# Patient Record
Sex: FEMALE | Race: WHITE | Employment: OTHER | URBAN - METROPOLITAN AREA
[De-identification: names, ages, dates, MRNs, and addresses within clinical notes are randomized per-mention and may not be internally consistent; named-entity substitution may affect disease eponyms.]

---

## 2017-03-04 ENCOUNTER — APPOINTMENT (OUTPATIENT)
Dept: GENERAL RADIOLOGY | Age: 67
End: 2017-03-04
Attending: NURSE PRACTITIONER
Payer: MEDICARE

## 2017-03-04 ENCOUNTER — HOSPITAL ENCOUNTER (EMERGENCY)
Age: 67
Discharge: HOME | End: 2017-03-04
Attending: STUDENT IN AN ORGANIZED HEALTH CARE EDUCATION/TRAINING PROGRAM
Payer: MEDICARE

## 2017-03-04 VITALS
HEIGHT: 64 IN | RESPIRATION RATE: 14 BRPM | TEMPERATURE: 98.1 F | BODY MASS INDEX: 22.2 KG/M2 | HEART RATE: 74 BPM | OXYGEN SATURATION: 99 % | WEIGHT: 130 LBS | DIASTOLIC BLOOD PRESSURE: 73 MMHG | SYSTOLIC BLOOD PRESSURE: 120 MMHG

## 2017-03-04 DIAGNOSIS — Z88.9 HX OF SEASONAL ALLERGIES: Primary | ICD-10-CM

## 2017-03-04 DIAGNOSIS — R07.89 ATYPICAL CHEST PAIN: ICD-10-CM

## 2017-03-04 LAB
ALBUMIN SERPL BCP-MCNC: 3.9 G/DL (ref 3.5–5)
ALBUMIN/GLOB SERPL: 1 {RATIO} (ref 1.1–2.2)
ALP SERPL-CCNC: 56 U/L (ref 45–117)
ALT SERPL-CCNC: 31 U/L (ref 12–78)
ANION GAP BLD CALC-SCNC: 6 MMOL/L (ref 5–15)
AST SERPL W P-5'-P-CCNC: 20 U/L (ref 15–37)
BASOPHILS # BLD AUTO: 0 K/UL (ref 0–0.1)
BASOPHILS # BLD: 1 % (ref 0–1)
BILIRUB SERPL-MCNC: 0.2 MG/DL (ref 0.2–1)
BUN SERPL-MCNC: 11 MG/DL (ref 6–20)
BUN/CREAT SERPL: 10 (ref 12–20)
CALCIUM SERPL-MCNC: 9.2 MG/DL (ref 8.5–10.1)
CHLORIDE SERPL-SCNC: 101 MMOL/L (ref 97–108)
CO2 SERPL-SCNC: 30 MMOL/L (ref 21–32)
CREAT SERPL-MCNC: 1.06 MG/DL (ref 0.55–1.02)
EOSINOPHIL # BLD: 0.2 K/UL (ref 0–0.4)
EOSINOPHIL NFR BLD: 3 % (ref 0–7)
ERYTHROCYTE [DISTWIDTH] IN BLOOD BY AUTOMATED COUNT: 13.6 % (ref 11.5–14.5)
GLOBULIN SER CALC-MCNC: 3.8 G/DL (ref 2–4)
GLUCOSE SERPL-MCNC: 113 MG/DL (ref 65–100)
HCT VFR BLD AUTO: 38.7 % (ref 35–47)
HGB BLD-MCNC: 12.6 G/DL (ref 11.5–16)
LYMPHOCYTES # BLD AUTO: 39 % (ref 12–49)
LYMPHOCYTES # BLD: 2.4 K/UL (ref 0.8–3.5)
MCH RBC QN AUTO: 27.8 PG (ref 26–34)
MCHC RBC AUTO-ENTMCNC: 32.6 G/DL (ref 30–36.5)
MCV RBC AUTO: 85.2 FL (ref 80–99)
MONOCYTES # BLD: 0.4 K/UL (ref 0–1)
MONOCYTES NFR BLD AUTO: 6 % (ref 5–13)
NEUTS SEG # BLD: 3.3 K/UL (ref 1.8–8)
NEUTS SEG NFR BLD AUTO: 51 % (ref 32–75)
PLATELET # BLD AUTO: 303 K/UL (ref 150–400)
POTASSIUM SERPL-SCNC: 4 MMOL/L (ref 3.5–5.1)
PROT SERPL-MCNC: 7.7 G/DL (ref 6.4–8.2)
RBC # BLD AUTO: 4.54 M/UL (ref 3.8–5.2)
SODIUM SERPL-SCNC: 137 MMOL/L (ref 136–145)
TROPONIN I SERPL-MCNC: <0.04 NG/ML
WBC # BLD AUTO: 6.3 K/UL (ref 3.6–11)

## 2017-03-04 PROCEDURE — 84484 ASSAY OF TROPONIN QUANT: CPT | Performed by: EMERGENCY MEDICINE

## 2017-03-04 PROCEDURE — 99284 EMERGENCY DEPT VISIT MOD MDM: CPT

## 2017-03-04 PROCEDURE — 93005 ELECTROCARDIOGRAM TRACING: CPT

## 2017-03-04 PROCEDURE — 85025 COMPLETE CBC W/AUTO DIFF WBC: CPT | Performed by: EMERGENCY MEDICINE

## 2017-03-04 PROCEDURE — 71020 XR CHEST PA LAT: CPT

## 2017-03-04 PROCEDURE — 36415 COLL VENOUS BLD VENIPUNCTURE: CPT | Performed by: EMERGENCY MEDICINE

## 2017-03-04 PROCEDURE — 80053 COMPREHEN METABOLIC PANEL: CPT | Performed by: EMERGENCY MEDICINE

## 2017-03-04 RX ORDER — KETOROLAC TROMETHAMINE 10 MG/1
10 TABLET, FILM COATED ORAL
Qty: 30 TAB | Refills: 0 | Status: SHIPPED | OUTPATIENT
Start: 2017-03-04 | End: 2019-05-20

## 2017-03-04 RX ORDER — FLUTICASONE PROPIONATE 50 MCG
2 SPRAY, SUSPENSION (ML) NASAL DAILY
Qty: 1 BOTTLE | Refills: 0 | Status: SHIPPED | OUTPATIENT
Start: 2017-03-04 | End: 2019-05-20

## 2017-03-04 NOTE — ED TRIAGE NOTES
Triage note: Pt states she has felt like an elephant was sitting on her chest x 3 days. Pt states her right shoulder hurts.

## 2017-03-05 NOTE — DISCHARGE INSTRUCTIONS
We hope that we have addressed all of your medical concerns. The examination and treatment you received in the Emergency Department were for an emergent problem and were not intended as complete care. It is important that you follow up with your healthcare provider(s) for ongoing care. If your symptoms worsen or do not improve as expected, and you are unable to reach your usual health care provider(s), you should return to the Emergency Department. Today's healthcare is undergoing tremendous change, and patient satisfaction surveys are one of the many tools to assess the quality of medical care. You may receive a survey from the [employer] regarding your experience in the Emergency Department. I hope that your experience has been completely positive, particularly the medical care that I provided. As such, please participate in the survey; anything less than excellent does not meet my expectations or intentions. [facility] and [address] [place] [address] participate in nationally recognized quality of care measures. If your blood pressure is greater than 120/80, as reported below, we urge that you seek medical care to address the potential of high blood pressure, commonly known as hypertension. Hypertension can be hereditary or can be caused by certain medical conditions, pain, stress, or \"white coat syndrome. \"       Please make an appointment with your health care provider(s) for follow up of your Emergency Department visit. VITALS:   Patient Vitals for the past 8 hrs:   Temp Pulse Resp BP SpO2   03/04/17 1500 97.8 °F (36.6 °C) 73 18 139/77 98 %          Thank you for allowing us to provide you with medical care today. We realize that you have many choices for your emergency care needs. Please choose us in the future for any continued health care needs. David Joaquin [place], [address] [place] [address].   Office: [phone]            Recent Results (from the past 24 hour(s))   EKG, 12 LEAD, INITIAL    Collection Time: 03/04/17  3:05 PM   Result Value Ref Range    Ventricular Rate 71 BPM    Atrial Rate 71 BPM    P-R Interval 180 ms    QRS Duration 82 ms    Q-T Interval 402 ms    QTC Calculation (Bezet) 436 ms    Calculated P Axis 68 degrees    Calculated R Axis 54 degrees    Calculated T Axis 54 degrees    Diagnosis       Normal sinus rhythm  When compared with ECG of 20-AUG-2003 02:14,  No significant change was found     CBC WITH AUTOMATED DIFF    Collection Time: 03/04/17  3:10 PM   Result Value Ref Range    WBC 6.3 3.6 - 11.0 K/uL    RBC 4.54 3.80 - 5.20 M/uL    HGB 12.6 11.5 - 16.0 g/dL    HCT 38.7 35.0 - 47.0 %    MCV 85.2 80.0 - 99.0 FL    MCH 27.8 26.0 - 34.0 PG    MCHC 32.6 30.0 - 36.5 g/dL    RDW 13.6 11.5 - 14.5 %    PLATELET 017 840 - 286 K/uL    NEUTROPHILS 51 32 - 75 %    LYMPHOCYTES 39 12 - 49 %    MONOCYTES 6 5 - 13 %    EOSINOPHILS 3 0 - 7 %    BASOPHILS 1 0 - 1 %    ABS. NEUTROPHILS 3.3 1.8 - 8.0 K/UL    ABS. LYMPHOCYTES 2.4 0.8 - 3.5 K/UL    ABS. MONOCYTES 0.4 0.0 - 1.0 K/UL    ABS. EOSINOPHILS 0.2 0.0 - 0.4 K/UL    ABS. BASOPHILS 0.0 0.0 - 0.1 K/UL   METABOLIC PANEL, COMPREHENSIVE    Collection Time: 03/04/17  3:10 PM   Result Value Ref Range    Sodium 137 136 - 145 mmol/L    Potassium 4.0 3.5 - 5.1 mmol/L    Chloride 101 97 - 108 mmol/L    CO2 30 21 - 32 mmol/L    Anion gap 6 5 - 15 mmol/L    Glucose 113 (H) 65 - 100 mg/dL    BUN 11 6 - 20 MG/DL    Creatinine 1.06 (H) 0.55 - 1.02 MG/DL    BUN/Creatinine ratio 10 (L) 12 - 20      GFR est AA >60 >60 ml/min/1.73m2    GFR est non-AA 52 (L) >60 ml/min/1.73m2    Calcium 9.2 8.5 - 10.1 MG/DL    Bilirubin, total 0.2 0.2 - 1.0 MG/DL    ALT (SGPT) 31 12 - 78 U/L    AST (SGOT) 20 15 - 37 U/L    Alk.  phosphatase 56 45 - 117 U/L    Protein, total 7.7 6.4 - 8.2 g/dL    Albumin 3.9 3.5 - 5.0 g/dL    Globulin 3.8 2.0 - 4.0 g/dL    A-G Ratio 1.0 (L) 1.1 - 2.2 TROPONIN I    Collection Time: 03/04/17  3:10 PM   Result Value Ref Range    Troponin-I, Qt. <0.04 <0.05 ng/mL       Xr Chest Pa Lat    Result Date: 3/4/2017  PA AND LATERAL CHEST RADIOGRAPHS: 3/4/2017 7:22 PM INDICATION: Chest pain, dyspnea for 3 days. COMPARISON: None. TECHNIQUE: Frontal and lateral radiographs of the chest. FINDINGS: The lungs are clear. The central airways are patent. The heart size is normal. No pneumothorax or pleural effusion. IMPRESSION: Clear lungs. Musculoskeletal Chest Pain: Care Instructions  Your Care Instructions  Chest pain is not always a sign that something is wrong with your heart or that you have another serious problem. The doctor thinks your chest pain is caused by strained muscles or ligaments, inflamed chest cartilage, or another problem in your chest, rather than by your heart. You may need more tests to find the cause of your chest pain. Follow-up care is a key part of your treatment and safety. Be sure to make and go to all appointments, and call your doctor if you are having problems. Its also a good idea to know your test results and keep a list of the medicines you take. How can you care for yourself at home? · Take pain medicines exactly as directed. ¨ If the doctor gave you a prescription medicine for pain, take it as prescribed. ¨ If you are not taking a prescription pain medicine, ask your doctor if you can take an over-the-counter medicine. · Rest and protect the sore area. · Stop, change, or take a break from any activity that may be causing your pain or soreness. · Put ice or a cold pack on the sore area for 10 to 20 minutes at a time. Try to do this every 1 to 2 hours for the next 3 days (when you are awake) or until the swelling goes down. Put a thin cloth between the ice and your skin. · After 2 or 3 days, apply a heating pad set on low or a warm cloth to the area that hurts.  Some doctors suggest that you go back and forth between hot and cold. · Do not wrap or tape your ribs for support. This may cause you to take smaller breaths, which could increase your risk of lung problems. · Mentholated creams such as Bengay or Icy Hot may soothe sore muscles. Follow the instructions on the package. · Follow your doctor's instructions for exercising. · Gentle stretching and massage may help you get better faster. Stretch slowly to the point just before pain begins, and hold the stretch for at least 15 to 30 seconds. Do this 3 or 4 times a day. Stretch just after you have applied heat. · As your pain gets better, slowly return to your normal activities. Any increased pain may be a sign that you need to rest a while longer. When should you call for help? Call 911 anytime you think you may need emergency care. For example, call if:  · You have chest pain or pressure. This may occur with:  ¨ Sweating. ¨ Shortness of breath. ¨ Nausea or vomiting. ¨ Pain that spreads from the chest to the neck, jaw, or one or both shoulders or arms. ¨ Dizziness or lightheadedness. ¨ A fast or uneven pulse. After calling 911, chew 1 adult-strength aspirin. Wait for an ambulance. Do not try to drive yourself. · You have sudden chest pain and shortness of breath, or you cough up blood. Call your doctor now or seek immediate medical care if:  · You have any trouble breathing. · Your chest pain gets worse. · Your chest pain occurs consistently with exercise and is relieved by rest.  Watch closely for changes in your health, and be sure to contact your doctor if:  · Your chest pain does not get better after 1 week. Where can you learn more? Go to http://franco-ele.info/. Enter V293 in the search box to learn more about \"Musculoskeletal Chest Pain: Care Instructions. \"  Current as of: May 27, 2016  Content Version: 11.1  © 5438-0655 [employer].  Care instructions adapted under license by [employer] (which disclaims liability or warranty for this information). If you have questions about a medical condition or this instruction, always ask your healthcare professional. [place] [address] any warranty or liability for your use of this information.

## 2017-03-05 NOTE — ED PROVIDER NOTES
HPI Comments: Antonio Sargent is a 78 y.o. female  who presents ambulatory to [facility] ED with cc of CP and R shoulder pain. Pt reports recent history of seasonal allergies with congestion, rhinorrhea (clear nasal drainage), and sore throat. She states she has experienced a \"heaviness\" to her chest over the last 3 days. She reports this as constant and relieved by taking NSAIDs. She states that she had atraumatic R shoulder pain today and this was concerning to her because of the CP. She reports she took her BP after the shoulder pain and noted it to be more elevated than normal at 140s/90s. She states that she had a stress test/ Holter monitor approximately 6 mos ago, both of which were normal studies. These were ordered because the patient was having palpitations and intermittent SOB. She doesn't abuse tobacco, have hx of CAD, or family hx of CAD. She has no hx of increased stressors, daily caffeine intake, or starting of herbal supplements. No F/C, N/V/D, MITCHELL, night sweats, dysuria, urinary frequency/hesitancy, flank pain. PCP: Liz Bunch MD    There are no other complaints, changes or physical findings at this time. The history is provided by the patient. History reviewed. No pertinent past medical history. Past Surgical History:   Procedure Laterality Date    COLONOSCOPY N/A 11/11/2016    COLONOSCOPY performed by Angy Huang MD at [facility] ENDOSCOPY    HX GYN      ovarian cyst         History reviewed. No pertinent family history. Social History     Social History    Marital status:      Spouse name: N/A    Number of children: N/A    Years of education: N/A     Occupational History    Not on file.      Social History Main Topics    Smoking status: Former Smoker     Quit date: 11/11/1970    Smokeless tobacco: Never Used    Alcohol use Yes    Drug use: Not on file    Sexual activity: Not on file     Other Topics Concern    Not on file     Social History Narrative ALLERGIES: Penicillin g    Review of Systems   Constitutional: Negative for activity change, appetite change, chills and fever. HENT: Positive for congestion, rhinorrhea and sore throat. Negative for sinus pressure and sneezing. Eyes: Negative for pain, discharge and visual disturbance. Respiratory: Negative for cough and shortness of breath. Cardiovascular: Positive for chest pain. Gastrointestinal: Negative for abdominal pain, diarrhea, nausea and vomiting. Genitourinary: Negative for dysuria, flank pain, frequency and urgency. Musculoskeletal: Positive for arthralgias. Negative for back pain, gait problem, joint swelling, myalgias and neck pain. Skin: Negative for color change and rash. Neurological: Negative for dizziness, speech difficulty, weakness, light-headedness, numbness and headaches. Psychiatric/Behavioral: Negative for agitation, behavioral problems and confusion. All other systems reviewed and are negative. Vitals:    03/04/17 1955 03/04/17 2000 03/04/17 2005 03/04/17 2010   BP:  120/73     Pulse: 77 83 75 74   Resp: 15 15 16 14   Temp:  98.1 °F (36.7 °C)     SpO2: 99% 97% 98% 99%   Weight:       Height:                Physical Exam   Constitutional: She is oriented to person, place, and time. She appears well-developed and well-nourished. No distress. HENT:   Head: Normocephalic and atraumatic. Right Ear: External ear normal.   Left Ear: External ear normal.   Nose: Mucosal edema and rhinorrhea present. Mouth/Throat: Uvula is midline and mucous membranes are normal. Posterior oropharyngeal edema present. No oropharyngeal exudate. Eyes: Conjunctivae and EOM are normal. Pupils are equal, round, and reactive to light. Neck: Normal range of motion. Neck supple. Cardiovascular: Normal rate, regular rhythm, normal heart sounds and intact distal pulses. Pulmonary/Chest: Effort normal and breath sounds normal.   Abdominal: Soft.  Bowel sounds are normal. There is no tenderness. There is no rebound and no guarding. Musculoskeletal: Normal range of motion. Right shoulder: Normal.   Neurological: She is alert and oriented to person, place, and time. Skin: Skin is warm and dry. Psychiatric: She has a normal mood and affect. Her behavior is normal. Judgment and thought content normal.   Nursing note and vitals reviewed. MDM  Number of Diagnoses or Management Options  Atypical chest pain:   Hx of seasonal allergies:   Diagnosis management comments: DDx: ACS, MSK CP, PNA     77 yo F presents with atypical CP. No emergent diagnostic findings. The patient has no chest pain on re-examination. She has had continuous chest pain for greater than 8 hours with one negative set of cardiac enzymes in the ER during this visit. Diagnosis, follow up, return instructions, test results, x-rays and medications have been discussed and reviewed with the patient and/or family. The patient and/or family have been given the opportunity to ask questions. The patient and/or family express understanding of the care plan, follow-up appointments and return instructions. The patient and/or family agree to follow up with cardiology as directed and to return immediately if the chest pain worsens. The patient and family express understanding that although cardiac testing at this time is negative, a cardiac problem could still be present and that a follow-up appointment with a cardiologist for further evaluation and risk factor modification is necessary to complete the evaluation of this complaint.          Amount and/or Complexity of Data Reviewed  Clinical lab tests: ordered and reviewed  Tests in the radiology section of CPT®: ordered and reviewed  Review and summarize past medical records: yes  Discuss the patient with other providers: yes      ED Course       Procedures    LABORATORY TESTS:    CBC WITH AUTOMATED DIFF (Final result) Component (Lab Inquiry)       Collection Time Result Time WBC RBC HGB HCT MCV     03/04/17 15:10:00 03/04/17 15:18:55 6.3 4.54 12.6 38.7 85.2          Collection Time Result Time MCH MCHC RDW PLT GRANS     03/04/17 15:10:00 03/04/17 15:18:55 27.8 32.6 13.6 303 51          Collection Time Result Time LYMPH MONOS EOS BASOS ABG     03/04/17 15:10:00 03/04/17 15:18:55 39 6 3 1 3.3          Collection Time Result Time ABL ABM CARLITO ABB     03/04/17 15:10:00 03/04/17 15:18:55 2.4 0.4 0.2 0.0                      METABOLIC PANEL, COMPREHENSIVE (Final result)    Abnormal Component (Lab Inquiry)       Collection Time Result Time NA K CL CO2 AGAP     03/04/17 15:10:00 03/04/17 15:45:31 137 4.0 101 30 6          Collection Time Result Time GLU BUN CREA BUCR GFRAA     03/04/17 15:10:00 03/04/17 15:45:31 113 (H) 11 1.06 (H) 10 (L) >60          Collection Time Result Time GFRNA CA TBIL GPT SGOT     03/04/17 15:10:00 03/04/17 15:45:31 Estimated GFR is calculated using the IDMS-traceable Modification of Diet in Renal Disease (MDRD) Study equation, reported for both  Americans (GFRAA) and non- Americans (GFRNA), and normalized to 1.73m2 body surface area. The physician must decide which value applies to the patient. The MDRD study equation should only be used in individuals age 25 or older. It has not been validated for the following: pregnant women, patients with serious comorbid conditions, or on certain medications, or persons with extremes of body size, muscle mass, or nutritional status.    ' data-bubble=\"IP_HOVER_BUBBLE_SERVICE\">52 (L)  Estimated GFR is calcu. .. 9.2 0.2 31 20          Collection Time Result Time AP TP ALB GLOB AGRAT     03/04/17 15:10:00 03/04/17 15:45:31 56 7.7 3.9 3.8 1.0 (L)                      TROPONIN I (Final result) Component (Lab Inquiry)       Collection Time Result Time TROIQ     03/04/17 15:10:00 03/04/17 15:45:31 The presence of detectable troponin above the reference range indicates myocardial injury which may be due to ischemia, myocarditis, trauma, etc.   Clinical correlation is necessary to establish the significance of this finding. Sequential testing is recommended to determine if the typical rise and fall of cTnI is demonstrated. Note: Cardiac troponin I has a relatively long half life and may be present well after the CK MB has returned to baseline. The reference range is based on the 99th percentile of the referent population.    ' data-bubble=\"IP_HOVER_BUBBLE_SERVICE\"><0.04  The presence of detect. ..              IMAGING RESULTS:  XR CHEST PA LAT   Final Result      PA AND LATERAL CHEST RADIOGRAPHS: 3/4/2017 7:22 PM     INDICATION: Chest pain, dyspnea for 3 days.     COMPARISON: None.     TECHNIQUE: Frontal and lateral radiographs of the chest.     FINDINGS:  The lungs are clear. The central airways are patent. The heart size is normal.  No pneumothorax or pleural effusion.     IMPRESSION  IMPRESSION:   Clear lungs. MEDICATIONS GIVEN:  Medications - No data to display    IMPRESSION:  1. Hx of seasonal allergies    2. Atypical chest pain        PLAN:  1. Discharge Medication List as of 3/4/2017  7:54 PM      START taking these medications    Details   fluticasone (FLONASE) 50 mcg/actuation nasal spray 2 Sprays by Both Nostrils route daily. , Print, Disp-1 Bottle, R-0      ketorolac (TORADOL) 10 mg tablet Take 1 Tab by mouth every six (6) hours as needed for Pain., Print, Disp-30 Tab, R-0         CONTINUE these medications which have NOT CHANGED    Details   cholecalciferol (VITAMIN D3) 1,000 unit tablet Take 1,000 Units by mouth daily. , Historical Med           2.    Follow-up Information     Follow up With Details Comments Contact Info    Stefan Saavedra MD Schedule an appointment as soon as possible for a visit Please go see on Monday or Tuesday for follow up from your ED visit  [address] [facility] [address]  [address] [phone]  [phone]      [place] [address] DEP Go to As needed, If symptoms worsen [id number] [facility]  [phone]    Jr Tolentino MD Schedule an appointment as soon as possible for a visit for any ongoing chest discomfort  [address] [facility]  [address]  [phone]          3. Return to ED if worse     Discharge Note:    The patient is ready for discharge. The patient's signs, symptoms, diagnosis, and discharge instruction have been discussed and the patient has conveyed their understanding. The patient is to follow up as recommended or return to the ER should their symptoms worsen. Plan has been discussed and the patient is in agreement.     Stephanie Barragan NP

## 2017-03-06 LAB
ATRIAL RATE: 71 BPM
CALCULATED P AXIS, ECG09: 68 DEGREES
CALCULATED R AXIS, ECG10: 54 DEGREES
CALCULATED T AXIS, ECG11: 54 DEGREES
DIAGNOSIS, 93000: NORMAL
P-R INTERVAL, ECG05: 180 MS
Q-T INTERVAL, ECG07: 402 MS
QRS DURATION, ECG06: 82 MS
QTC CALCULATION (BEZET), ECG08: 436 MS
VENTRICULAR RATE, ECG03: 71 BPM

## 2018-07-16 ENCOUNTER — HOSPITAL ENCOUNTER (EMERGENCY)
Age: 68
Discharge: HOME | End: 2018-07-16
Attending: STUDENT IN AN ORGANIZED HEALTH CARE EDUCATION/TRAINING PROGRAM
Payer: MEDICARE

## 2018-07-16 VITALS
HEART RATE: 78 BPM | SYSTOLIC BLOOD PRESSURE: 137 MMHG | BODY MASS INDEX: 23.45 KG/M2 | DIASTOLIC BLOOD PRESSURE: 80 MMHG | RESPIRATION RATE: 16 BRPM | OXYGEN SATURATION: 98 % | WEIGHT: 137.38 LBS | HEIGHT: 64 IN | TEMPERATURE: 98.1 F

## 2018-07-16 DIAGNOSIS — S81.811A LACERATION OF RIGHT LOWER EXTREMITY EXCLUDING THIGH, INITIAL ENCOUNTER: Primary | ICD-10-CM

## 2018-07-16 PROCEDURE — 74011000250 HC RX REV CODE- 250: Performed by: STUDENT IN AN ORGANIZED HEALTH CARE EDUCATION/TRAINING PROGRAM

## 2018-07-16 PROCEDURE — 90471 IMMUNIZATION ADMIN: CPT

## 2018-07-16 PROCEDURE — 75810000293 HC SIMP/SUPERF WND  RPR

## 2018-07-16 PROCEDURE — 77030018836 HC SOL IRR NACL ICUM -A

## 2018-07-16 PROCEDURE — 77030002986 HC SUT PROL J&J -A

## 2018-07-16 PROCEDURE — 90715 TDAP VACCINE 7 YRS/> IM: CPT | Performed by: PHYSICIAN ASSISTANT

## 2018-07-16 PROCEDURE — 99282 EMERGENCY DEPT VISIT SF MDM: CPT

## 2018-07-16 PROCEDURE — 74011250636 HC RX REV CODE- 250/636: Performed by: PHYSICIAN ASSISTANT

## 2018-07-16 RX ORDER — LIDOCAINE HYDROCHLORIDE AND EPINEPHRINE 10; 10 MG/ML; UG/ML
1.5 INJECTION, SOLUTION INFILTRATION; PERINEURAL
Status: COMPLETED | OUTPATIENT
Start: 2018-07-16 | End: 2018-07-16

## 2018-07-16 RX ORDER — BACITRACIN 500 UNIT/G
1 PACKET (EA) TOPICAL
Status: COMPLETED | OUTPATIENT
Start: 2018-07-16 | End: 2018-07-16

## 2018-07-16 RX ADMIN — BACITRACIN 1 PACKET: 500 OINTMENT TOPICAL at 16:19

## 2018-07-16 RX ADMIN — TETANUS TOXOID, REDUCED DIPHTHERIA TOXOID AND ACELLULAR PERTUSSIS VACCINE, ADSORBED 0.5 ML: 5; 2.5; 8; 8; 2.5 SUSPENSION INTRAMUSCULAR at 14:27

## 2018-07-16 RX ADMIN — LIDOCAINE HYDROCHLORIDE,EPINEPHRINE BITARTRATE 15 MG: 10; .01 INJECTION, SOLUTION INFILTRATION; PERINEURAL at 15:07

## 2018-07-16 NOTE — ED TRIAGE NOTES
Pt states that a storm door hit the back of her right heel causing a lot of bleeding which has stopped at this time. Tetanus is not up to date.

## 2018-07-16 NOTE — ED NOTES
1:58 PM  I have evaluated the patient as the Provider in Triage. I have reviewed Her vital signs and the triage nurse assessment. I have talked with the patient and any available family and advised that I am the provider in triage and have ordered the appropriate study to initiate their work up based on the clinical presentation during my assessment. I have advised that the patient will be accommodated in the Main ED as soon as possible. I have also requested to contact the triage nurse or myself immediately if the patient experiences any changes in their condition during this brief waiting period. Patient states the storm door closed on the back of her right ankle this morning around 9:30 causing a laceration. She has been unable to get the bleeding to stop at home. Does not take aspirin or blood thinners. Tetanus is not up to date.   ED [place]

## 2018-07-16 NOTE — ED PROVIDER NOTES
HPI Comments: 75 y.o. female with no significant past medical history who presents from home with chief complaint of laceration. Patient states this morning at 0930 she was walking out of the door when the storm door slammed into the back of her right ankle. Patient states immediate bleeding and notes she bandaged the laceration herself. Patient reports continuation of bleeding after multiple bandages and came to [facility] ED. Patient presents to [facility] ED with 3 cm laceration over the right achilles tendon with active bleeding. Patient is unsure if her Tetanus is up to date. Patient denies taking medications or blood thinners. Patient denies having any ongoing medical problems. Pt denies fever, chills, cough, congestion, shortness of breath, chest pain, abdominal pain, nausea, vomiting, diarrhea, difficulty with urination or dysuria. There are no other acute medical concerns at this time. PCP: Viktoriya Engle MD    Note written by Tip Martino, as dictated by Gilma Manzanares MD 4:16 PM       The history is provided by the patient. History reviewed. No pertinent past medical history. Past Surgical History:   Procedure Laterality Date    COLONOSCOPY N/A 11/11/2016    COLONOSCOPY performed by Katelin Hoang MD at [facility] ENDOSCOPY    HX GYN      ovarian cyst         History reviewed. No pertinent family history. Social History     Social History    Marital status:      Spouse name: N/A    Number of children: N/A    Years of education: N/A     Occupational History    Not on file. Social History Main Topics    Smoking status: Former Smoker     Quit date: 11/11/1970    Smokeless tobacco: Never Used    Alcohol use Yes    Drug use: No    Sexual activity: Not on file     Other Topics Concern    Not on file     Social History Narrative         ALLERGIES: Penicillin g and Shellfish derived    Review of Systems   Constitutional: Negative for chills and fever.    HENT: Negative for congestion and sore throat. Respiratory: Negative for cough and shortness of breath. Cardiovascular: Negative for chest pain. Gastrointestinal: Negative for abdominal pain, diarrhea, nausea and vomiting. Genitourinary: Negative for difficulty urinating and dysuria. Musculoskeletal: Negative for back pain. Skin: Positive for wound. Negative for rash. Neurological: Negative for syncope and headaches. Psychiatric/Behavioral: Negative for confusion. All other systems reviewed and are negative. Vitals:    07/16/18 1400   BP: 137/80   Pulse: 78   Resp: 16   Temp: 98.1 °F (36.7 °C)   SpO2: 98%   Weight: 62.3 kg (137 lb 6 oz)   Height: 5' 4\" (1.626 m)            Physical Exam   Constitutional: She is oriented to person, place, and time. She appears well-developed. No distress. HENT:   Head: Normocephalic and atraumatic. Eyes: Conjunctivae and EOM are normal. Pupils are equal, round, and reactive to light. Neck: Normal range of motion. Neck supple. Cardiovascular: Normal rate, regular rhythm and normal heart sounds. No murmur heard. Pulmonary/Chest: Effort normal and breath sounds normal. No respiratory distress. Abdominal: Soft. Bowel sounds are normal. She exhibits no distension. There is no tenderness. There is no rebound. Musculoskeletal: Normal range of motion. She exhibits no edema. No achilles tendon deficit   Neurological: She is alert and oriented to person, place, and time. No cranial nerve deficit. She exhibits normal muscle tone. Coordination normal.   Skin: Skin is warm and dry. No rash noted. 6 cm laceration overlying right achilles tendon   Psychiatric: She has a normal mood and affect. Her behavior is normal.   Nursing note and vitals reviewed.   Note written by Tip Martino, as dictated by Gilma Manzanares MD 4:11 PM       [facility]      ED Course       Wound Repair  Date/Time: 7/16/2018 4:11 PM  Performed by: attendingProcedure prep: Prepped with saline. Pre-procedure re-eval: Immediately prior to the procedure, the patient was reevaluated and found suitable for the planned procedure and any planned medications. Location: Right achilles heel. Wound length:2.6 - 7.5 cm (6 cm)    Anesthesia:  Local Anesthetic: lidocaine 1% with epinephrine  Foreign bodies: no foreign bodies  Irrigation solution: saline  Irrigation method: syringe  Debridement: none  Skin closure: Prolene (4-0)  Number of sutures: 7  Technique: simple and interrupted  Approximation: close  Dressing: antibiotic ointment  Patient tolerance: Patient tolerated the procedure well with no immediate complications      PROGRESS NOTE:  4:04 PM Provider with patient suturing laceration.

## 2018-07-16 NOTE — DISCHARGE INSTRUCTIONS
Cuts: Care Instructions  Your Care Instructions  A cut can happen anywhere on your body. Stitches, staples, skin adhesives, or pieces of tape called Steri-Strips are sometimes used to keep the edges of a cut together and help it heal. Steri-Strips can be used by themselves or with stitches or staples. Sometimes cuts are left open. If the cut went deep and through the skin, the doctor may have closed the cut in two layers. A deeper layer of stitches brings the deep part of the cut together. These stitches will dissolve and don't need to be removed. The upper layer closure, which could be stitches, staples, Steri-Strips, or adhesive, is what you see on the cut. A cut is often covered by a bandage. The doctor has checked you carefully, but problems can develop later. If you notice any problems or new symptoms, get medical treatment right away. Follow-up care is a key part of your treatment and safety. Be sure to make and go to all appointments, and call your doctor if you are having problems. It's also a good idea to know your test results and keep a list of the medicines you take. How can you care for yourself at home? If a cut is open or closed  · Prop up the sore area on a pillow anytime you sit or lie down during the next 3 days. Try to keep it above the level of your heart. This will help reduce swelling. · Keep the cut dry for the first 24 to 48 hours. After this, you can shower if your doctor okays it. Pat the cut dry. · Don't soak the cut, such as in a bathtub. Your doctor will tell you when it's safe to get the cut wet. · After the first 24 to 48 hours, clean the cut with soap and water 2 times a day unless your doctor gives you different instructions. ¨ Don't use hydrogen peroxide or alcohol, which can slow healing. ¨ You may cover the cut with a thin layer of petroleum jelly and a nonstick bandage.   ¨ If the doctor put a bandage over the cut, put on a new bandage after cleaning the cut or if the bandage gets wet or dirty. · Avoid any activity that could cause your cut to reopen. · Be safe with medicines. Read and follow all instructions on the label. ¨ If the doctor gave you a prescription medicine for pain, take it as prescribed. ¨ If you are not taking a prescription pain medicine, ask your doctor if you can take an over-the-counter medicine. If the cut is closed with stitches, staples, or Steri-Strips  · Follow the above instructions for open or closed cuts. · Do not remove the stitches or staples on your own. Your doctor will tell you when to come back to have the stitches or staples removed. · Leave Steri-Strips on until they fall off. If the cut is closed with a skin adhesive  · Follow the above instructions for open or closed cuts. · Leave the skin adhesive on your skin until it falls off on its own. This may take 5 to 10 days. · Do not scratch, rub, or pick at the adhesive. · Do not put the sticky part of a bandage directly on the adhesive. · Do not put any kind of ointment, cream, or lotion over the area. This can make the adhesive fall off too soon. Do not use hydrogen peroxide or alcohol, which can slow healing. When should you call for help? Call your doctor now or seek immediate medical care if:    · You have new pain, or your pain gets worse.     · The skin near the cut is cold or pale or changes color.     · You have tingling, weakness, or numbness near the cut.     · The cut starts to bleed, and blood soaks through the bandage. Oozing small amounts of blood is normal.     · You have trouble moving the area near the cut.     · You have symptoms of infection, such as:  ¨ Increased pain, swelling, warmth, or redness around the cut. ¨ Red streaks leading from the cut. ¨ Pus draining from the cut. ¨ A fever.    Watch closely for changes in your health, and be sure to contact your doctor if:    · The cut reopens.     · You do not get better as expected.    Where can you learn more? Go to http://franco-ele.info/. Enter M735 in the search box to learn more about \"Cuts: Care Instructions. \"  Current as of: November 20, 2017  Content Version: 11.7  © 9080-5187 [employer]. Care instructions adapted under license by [employer] (which disclaims liability or warranty for this information). If you have questions about a medical condition or this instruction, always ask your healthcare professional. [address] any warranty or liability for your use of this information.

## 2019-04-30 ENCOUNTER — HOSPITAL ENCOUNTER (OUTPATIENT)
Dept: MAMMOGRAPHY | Age: 69
Discharge: HOME | End: 2019-04-30
Attending: INTERNAL MEDICINE
Payer: MEDICARE

## 2019-04-30 DIAGNOSIS — Z12.39 BREAST SCREENING, UNSPECIFIED: ICD-10-CM

## 2019-04-30 PROCEDURE — 77067 SCR MAMMO BI INCL CAD: CPT

## 2019-05-20 ENCOUNTER — OFFICE VISIT (OUTPATIENT)
Dept: INTERNAL MEDICINE CLINIC | Age: 69
End: 2019-05-20

## 2019-05-20 ENCOUNTER — HOSPITAL ENCOUNTER (OUTPATIENT)
Dept: LAB | Age: 69
Discharge: HOME | End: 2019-05-20
Payer: MEDICARE

## 2019-05-20 VITALS
HEIGHT: 64 IN | WEIGHT: 129 LBS | DIASTOLIC BLOOD PRESSURE: 60 MMHG | TEMPERATURE: 97.7 F | BODY MASS INDEX: 22.02 KG/M2 | RESPIRATION RATE: 16 BRPM | OXYGEN SATURATION: 97 % | SYSTOLIC BLOOD PRESSURE: 104 MMHG | HEART RATE: 79 BPM

## 2019-05-20 DIAGNOSIS — R25.2 LEG CRAMPING: ICD-10-CM

## 2019-05-20 DIAGNOSIS — Z13.820 SCREENING FOR OSTEOPOROSIS: ICD-10-CM

## 2019-05-20 DIAGNOSIS — E55.9 VITAMIN D DEFICIENCY: ICD-10-CM

## 2019-05-20 DIAGNOSIS — Z13.220 SCREENING FOR HYPERLIPIDEMIA: ICD-10-CM

## 2019-05-20 DIAGNOSIS — Z23 ENCOUNTER FOR IMMUNIZATION: ICD-10-CM

## 2019-05-20 DIAGNOSIS — Z13.1 SCREENING FOR DIABETES MELLITUS: ICD-10-CM

## 2019-05-20 DIAGNOSIS — Z00.00 MEDICARE ANNUAL WELLNESS VISIT, SUBSEQUENT: Primary | ICD-10-CM

## 2019-05-20 DIAGNOSIS — Z78.0 POSTMENOPAUSAL: ICD-10-CM

## 2019-05-20 DIAGNOSIS — F51.01 PRIMARY INSOMNIA: ICD-10-CM

## 2019-05-20 PROCEDURE — 82306 VITAMIN D 25 HYDROXY: CPT

## 2019-05-20 PROCEDURE — 83735 ASSAY OF MAGNESIUM: CPT

## 2019-05-20 PROCEDURE — 80053 COMPREHEN METABOLIC PANEL: CPT

## 2019-05-20 PROCEDURE — 80061 LIPID PANEL: CPT

## 2019-05-20 PROCEDURE — 85027 COMPLETE CBC AUTOMATED: CPT

## 2019-05-20 PROCEDURE — 36415 COLL VENOUS BLD VENIPUNCTURE: CPT

## 2019-05-20 NOTE — PATIENT INSTRUCTIONS
Medicare Wellness Visit, Female The best way to live healthy is to have a lifestyle where you eat a well-balanced diet, exercise regularly, limit alcohol use, and quit all forms of tobacco/nicotine, if applicable. Regular preventive services are another way to keep healthy. Preventive services (vaccines, screening tests, monitoring & exams) can help personalize your care plan, which helps you manage your own care. Screening tests can find health problems at the earliest stages, when they are easiest to treat. [facility] follows the current, evidence-based guidelines published by the [facility] ([facility]) when recommending preventive services for our patients. Because we follow these guidelines, sometimes recommendations change over time as research supports it. (For example, mammograms used to be recommended annually. Even though Medicare will still pay for an annual mammogram, the newer guidelines recommend a mammogram every two years for women of average risk.) Of course, you and your doctor may decide to screen more often for some diseases, based on your risk and your health status. Preventive services for you include: - Medicare offers their members a free annual wellness visit, which is time for you and your primary care provider to discuss and plan for your preventive service needs. Take advantage of this benefit every year! 
-All adults over the age of 72 should receive the recommended pneumonia vaccines. Current USPSTF guidelines recommend a series of two vaccines for the best pneumonia protection.  
-All adults should have a flu vaccine yearly and a tetanus vaccine every 10 years. All adults age 61 and older should receive a shingles vaccine once in their lifetime.   
-A bone mass density test is recommended when a woman turns 65 to screen for osteoporosis. This test is only recommended one time, as a screening. Some providers will use this same test as a disease monitoring tool if you already have osteoporosis. -All adults age 38-68 who are overweight should have a diabetes screening test once every three years.  
-Other screening tests and preventive services for persons with diabetes include: an eye exam to screen for diabetic retinopathy, a kidney function test, a foot exam, and stricter control over your cholesterol.  
-Cardiovascular screening for adults with routine risk involves an electrocardiogram (ECG) at intervals determined by your doctor.  
-Colorectal cancer screenings should be done for adults age 54-65 with no increased risk factors for colorectal cancer. There are a number of acceptable methods of screening for this type of cancer. Each test has its own benefits and drawbacks. Discuss with your doctor what is most appropriate for you during your annual wellness visit. The different tests include: colonoscopy (considered the best screening method), a fecal occult blood test, a fecal DNA test, and sigmoidoscopy. -Breast cancer screenings are recommended every other year for women of normal risk, age 54-69. 
-Cervical cancer screenings for women over age 72 are only recommended with certain risk factors.  
-All adults born between [facility] should be screened once for Hepatitis C. Learning About Sleeping Well What does sleeping well mean? Sleeping well means getting enough sleep. How much sleep is enough varies among people. The number of hours you sleep is not as important as how you feel when you wake up. If you do not feel refreshed, you probably need more sleep. Another sign of not getting enough sleep is feeling tired during the day. The average total nightly sleep time is 7½ to 8 hours. Healthy adults may need a little more or a little less than this. Why is getting enough sleep important? Getting enough quality sleep is a basic part of good health.  When your sleep suffers, your mood and your thoughts can suffer too. You may find yourself feeling more grumpy or stressed. Not getting enough sleep also can lead to serious problems, including injury, accidents, anxiety, and depression. What might cause poor sleeping? Many things can cause sleep problems, including: · Stress. Stress can be caused by fear about a single event, such as giving a speech. Or you may have ongoing stress, such as worry about work or school. · Depression, anxiety, and other mental or emotional conditions. · Changes in your sleep habits or surroundings. This includes changes that happen where you sleep, such as noise, light, or sleeping in a different bed. It also includes changes in your sleep pattern, such as having jet lag or working a late shift. · Health problems, such as pain, breathing problems, and restless legs syndrome. · Lack of regular exercise. How can you help yourself? Here are some tips that may help you sleep more soundly and wake up feeling more refreshed. Your sleeping area · Use your bedroom only for sleeping and sex. A bit of light reading may help you fall asleep. But if it doesn't, do your reading elsewhere in the house. Don't watch TV in bed. · Be sure your bed is big enough to stretch out comfortably, especially if you have a sleep partner. · Keep your bedroom quiet, dark, and cool. Use curtains, blinds, or a sleep mask to block out light. To block out noise, use earplugs, soothing music, or a \"white noise\" machine. Your evening and bedtime routine · Create a relaxing bedtime routine. You might want to take a warm shower or bath, listen to soothing music, or drink a cup of noncaffeinated tea. · Go to bed at the same time every night. And get up at the same time every morning, even if you feel tired. What to avoid · Limit caffeine (coffee, tea, caffeinated sodas) during the day, and don't have any for at least 4 to 6 hours before bedtime. · Don't drink alcohol before bedtime. Alcohol can cause you to wake up more often during the night. · Don't smoke or use tobacco, especially in the evening. Nicotine can keep you awake. · Don't take naps during the day, especially close to bedtime. · Don't lie in bed awake for too long. If you can't fall asleep, or if you wake up in the middle of the night and can't get back to sleep within 15 minutes or so, get out of bed and go to another room until you feel sleepy. · Don't take medicine right before bed that may keep you awake or make you feel hyper or energized. Your doctor can tell you if your medicine may do this and if you can take it earlier in the day. If you can't sleep · Imagine yourself in a peaceful, pleasant scene. Focus on the details and feelings of being in a place that is relaxing. · Get up and do a quiet or boring activity until you feel sleepy. · Don't drink any liquids after 6 p.m. if you wake up often because you have to go to the bathroom. Where can you learn more? Go to http://franco-ele.info/. Enter Z408 in the search box to learn more about \"Learning About Sleeping Well. \" Current as of: September 11, 2018 Content Version: 11.9 © 0649-7292 [employer]. Care instructions adapted under license by "[employer]" (which disclaims liability or warranty for this information). If you have questions about a medical condition or this instruction, always ask your healthcare professional. [place] [address] any warranty or liability for your use of this information. Calcium 1200-1500mg daily, most from food. Vitamin D3 2,000 iu daily. For sleep, melatonin 2mg up to 10mg. Sleep tea. Hand Arthritis: Exercises Your Care Instructions Here are some examples of exercises for hand arthritis. Start each exercise slowly. Ease off the exercise if you start to have pain. Your doctor or your physical or occupational therapist will tell you when you can start these exercises and which ones will work best for you. How to do the exercises Tendon magdalena 1. In this exercise, the steps follow one another to a make a continuous movement. 2. With your affected hand, point your fingers and thumb straight up. Your wrist should be relaxed, following the line of your fingers and thumb. 3. Curl your fingers so that the top two joints in them are bent, and your fingers wrap down. Your fingertips should touch or be near the base of your fingers. Your fingers will look like a hook. 4. Make a fist by bending your knuckles. Your thumb can gently rest against your index (pointing) finger. 5. Unwind your fingers slightly so that your fingertips can touch the base of your palm. Your thumb can rest against your index finger. 6. Move back to your starting position, with your fingers and thumb pointing up. 7. Repeat the series of motions 8 to 12 times. 8. Switch hands and repeat steps 1 through 6, even if only one hand is sore. Intrinsic flexion 1. Rest your affected hand on a table and bend the large joints where your fingers connect to your hand. Keep your thumb and the other joints in your fingers straight. 2. Slowly straighten your fingers. Your wrist should be relaxed, following the line of your fingers and thumb. 3. Move back to your starting position, with your hand bent. 4. Repeat 8 to 12 times. 5. Switch hands and repeat steps 1 through 4, even if only one hand is sore. Finger extension 1. Place your affected hand flat on a table. 2. Lift and then lower one finger at a time off the table. 3. Repeat 8 to 12 times. 4. Switch hands and repeat steps 1 through 3, even if only one hand is sore. MP extension 1. Place your good hand on a table, palm up.  Put your affected hand on top of your good hand with your fingers wrapped around the thumb of your good hand like you are making a fist. 
2. Slowly uncurl the joints of your affected hand where your fingers connect to your hand so that only the top two joints of your fingers are bent. Your fingers will look like a hook. 3. Move back to your starting position, with your fingers wrapped around your good thumb. 4. Repeat 8 to 12 times. 5. Switch hands and repeat steps 1 through 4, even if only one hand is sore. PIP extension (with MP extension) 1. Place your good hand on a table, palm up. Put your affected hand on top of your good hand, palm up. 2. Use the thumb and fingers of your good hand to grasp below the middle joint of one finger of your affected hand. 3. Straighten the last two joints of that finger. 4. Repeat 8 to 12 times. 5. Repeat steps 1 through 4 with each finger. 6. Switch hands and repeat steps 1 through 5, even if only one hand is sore. DIP flexion 1. With your good hand, grasp one finger of your affected hand. Your thumb will be on the top side of your finger just below the joint that is closest to your fingernail. 2. Slowly bend your affected finger only at the joint closest to your fingernail. 3. Repeat 8 to 12 times. 4. Repeat steps 1 through 3 with each finger. 5. Switch hands and repeat steps 1 through 4, even if only one hand is sore. Follow-up care is a key part of your treatment and safety. Be sure to make and go to all appointments, and call your doctor if you are having problems. It's also a good idea to know your test results and keep a list of the medicines you take. Where can you learn more? Go to http://franco-eel.info/. Enter F985 in the search box to learn more about \"Hand Arthritis: Exercises. \" Current as of: September 20, 2018 Content Version: 11.9 © 0761-7047 [employer].  Care instructions adapted under license by [employer] (which disclaims liability or warranty for this information). If you have questions about a medical condition or this instruction, always ask your healthcare professional. [place] [address] any warranty or liability for your use of this information. Pneumococcal 13-Valent Vaccine, Diphtheria Conjugate (By injection) Pneumococcal 13-Valent Vaccine, Diphtheria Conjugate (WQD-aan-GIL-al 13-VAY-lent VAX-een, dif-THEER-ee-a [id number]-[id number]-[id number]) Prevents infections, such as pneumonia and meningitis. Brand Name(s): Prevnar 13 There may be other brand names for this medicine. When This Medicine Should Not Be Used: This vaccine is not right for everyone. You should not receive it if you had an allergic reaction to pneumococcal or diphtheria vaccine. How to Use This Medicine:  
Injectable · A nurse or other health provider will give you this medicine. This vaccine is usually given as a shot into a muscle in the thigh or upper arm. · The vaccine schedule is different for different people. ¨ Tell your doctor if your child was born prematurely. Children who were premature may need to follow a different schedule. ¨ Children younger than 10years of age: This vaccine is usually given as 3 or 4 separate shots over several months. Your child's doctor will tell you how many shots are needed and when to come back for the next one. ¨ Children older than 10years of age: This vaccine is given as a single shot. If your child recently received another pneumonia vaccine, this one should be given at least 8 weeks later. ¨ Adults older than 25years of age: This vaccine is given as a single dose. · It is very important for your child to receive all of the shots for the vaccine. · Missed dose: This vaccine must be given on a fixed schedule. If your child misses a dose, call your child's doctor for another appointment. Drugs and Foods to Avoid: Ask your doctor or pharmacist before using any other medicine, including over-the-counter medicines, vitamins, and herbal products. · Some foods and medicines can affect how this vaccine works. Tell your doctor if you are receiving a treatment or medicine that causes a weak immune system. This includes radiation treatment, steroid medicine (including hydrocortisone, methylprednisolone, prednisolone, prednisone), or cancer medicine. Warnings While Using This Medicine: · Tell your doctor if you are pregnant or breastfeeding. · Tell your doctor if you have a weak immune system. You may not be fully protected by this vaccine. Possible Side Effects While Using This Medicine:  
Call your doctor right away if you notice any of these side effects: · Allergic reaction: Itching or hives, swelling in your face or hands, swelling or tingling in your mouth or throat, chest tightness, trouble breathing · High fever If you notice these less serious side effects, talk with your doctor: · Crying, irritability, or fussiness · Joint or muscle pain · Mild skin rash · Pain, burning, redness, or swelling where the shot was given · Poor appetite · Sleep changes If you notice other side effects that you think are caused by this medicine, tell your doctor. Call your doctor for medical advice about side effects. You may report side effects to FDA at 2-170-FDA-0895 © 2017 [facility] Information is for End User's use only and may not be sold, redistributed or otherwise used for commercial purposes. The above information is an  only. It is not intended as medical advice for individual conditions or treatments. Talk to your doctor, nurse or pharmacist before following any medical regimen to see if it is safe and effective for you.

## 2019-05-20 NOTE — PROGRESS NOTES
HPI: Deepali Thayer is a 70 y.o. female presents to establish. Reports fatigue. Not sleeping well. Cannot stay asleep. Up to urinate 1-2 times at night. Normal sleep latency. Is active at home. Was having leg cramping. Now, the cramping is more diffuse. Has left foot garnett's neuroma. Will get some pain. History of environmental allergies, milder now, taking benadryl infrequently. Last pap, 3 years ago, to see Dr. Skinny Carcamo. Monogamous. No DUB. Normal urination. Mammogram normal, not 3D. Prior DEXA, \" a little thinner\". Treatment was recommended. Last test was 20 years ago. Prior colonoscopy, age 47, polyp, two other tests, negative. No family history of colon cancer. Last test a few years ago when having diarrhea symptoms. Normal BM now. Up to date on eye, prior bilateral cataract surgery, 5 years ago. Up to date on dental.      Last labs one year ago. Seen at a wellness center for evaluation for bioidentical hormone treatment. She reports menopause age 49. Feeling hot most of the time.         ROS:  Constitutional: negative for fevers, chills, anorexia, weight loss, positive for fatigue, insomnia  Eyes:   negative for visual disturbance, irritation  ENT:   negative for tinnitus,sore throat,nasal congestion,ear pain,  Respiratory:  negative for cough, hemoptysis, dyspnea,wheezing  CV:   negative for chest pain, palpitations, lower extremity edema  GI:   negative for nausea, vomiting, diarrhea, abdominal pain,melena  Endo:               negative for polyuria,polydipsia,polyphagia,heat intolerance  Genitourinary: negative for frequency, dysuria, hematuria  Integument:  negative for rash, pruritus  Hematologic:  negative for easy bruising and gum/nose bleeding  Musculoskel: negative for myalgias, back pain, muscle weakness, joint pain  Neurological:  negative for headaches, dizziness, gait problems, numbness  Behavl/Psych: negative for feelings of anxiety, depression, mood changes    History reviewed. No pertinent past medical history. Past Surgical History:   Procedure Laterality Date    COLONOSCOPY N/A 2016    COLONOSCOPY performed by La Alfaro MD at [address] [facility]      ovarian cyst, as teen, one ovary removed ? right. Social History     Socioeconomic History    Marital status:      Spouse name: Not on file    Number of children: Not on file    Years of education: Not on file    Highest education level: Not on file   Tobacco Use    Smoking status: Former Smoker     Last attempt to quit: 1970     Years since quittin.5    Smokeless tobacco: Never Used   Substance and Sexual Activity    Alcohol use: Yes    Drug use: No    Sexual activity: Not Currently     Partners: Male     Family History   Problem Relation Age of Onset    Cancer Mother         cervical    Hypertension Mother    Sujatha Man Bladder Disease Mother     Heart Disease Father     No Known Problems Sister     No Known Problems Brother      Current Outpatient Medications   Medication Sig Dispense Refill    cholecalciferol (VITAMIN D3) 1,000 unit tablet Take 1,000 Units by mouth daily.        Allergies   Allergen Reactions    Penicillin G Other (comments)     Told by parents, not sure    Shellfish Derived Angioedema         Physical exam:  Visit Vitals  /60 (BP 1 Location: Left arm, BP Patient Position: Sitting)   Pulse 79   Temp 97.7 °F (36.5 °C) (Oral)   Resp 16   Ht 5' 4\" (1.626 m)   Wt 129 lb (58.5 kg)   SpO2 97%   BMI 22.14 kg/m²     General appearance - alert, well appearing, and in no distress  HEENT- PERLL,normal conjunctiva, TM normal bilaterally, hearing grossly normal, mucous membranes moist, pharynx normal without lesions  Neck - supple, no significant adenopathy   Pulm- clear to auscultation, no wheezes, rales or rhonchi  CV- normal rate, regular rhythm, normal S1, S2, no murmurs   Abdomen - soft, nontender, nondistended, no masses or organomegaly  Extrem-peripheral pulses normal, no pedal edema, oa hands. Neuro -alert, oriented,nonfocal    Assessment/Plan:      ICD-10-CM ICD-9-CM    1. Medicare annual wellness visit, subsequent Z00.00 V70.0    2. Postmenopausal Z78.0 V49.81 DEXA BONE DENSITY STUDY AXIAL   3. Screening for osteoporosis Z13.820 V82.81 DEXA BONE DENSITY STUDY AXIAL   4. Screening for diabetes mellitus T49.8 U85.1 METABOLIC PANEL, COMPREHENSIVE   5. Screening for hyperlipidemia Z13.220 V77.91 LIPID PANEL   6. Vitamin D deficiency E55.9 268.9 VITAMIN D, 25 HYDROXY   7. Leg cramping F21.9 989.08 METABOLIC PANEL, COMPREHENSIVE      MAGNESIUM      CBC W/O DIFF   8. Primary insomnia F51.01 307.42    9. Encounter for immunization Z23 V03.89 pneumococcal 13 harshil conj dip (PREVNAR-13) 0.5 mL syrg injection      varicella-zoster recombinant, PF, (SHINGRIX) 50 mcg/0.5 mL susr injection   given information on sleep hygiene, melatonin 2mg to 10mg prn. Calcium and vitamin D recommended.       Follow-up and Dispositions    · Return for follow up pending labs and annual. .         Poncho Malik MD

## 2019-05-20 NOTE — PROGRESS NOTES
HPI:   This is the Subsequent Medicare Annual Wellness Exam, performed 12 months or more after the Initial AWV or the last Subsequent AWV    I have reviewed the patient's medical history in detail and updated the computerized patient record. History   History reviewed. No pertinent past medical history. Past Surgical History:   Procedure Laterality Date    COLONOSCOPY N/A 2016    COLONOSCOPY performed by Malissa Reed MD at [address] [facility]      ovarian cyst     Current Outpatient Medications   Medication Sig Dispense Refill    cholecalciferol (VITAMIN D3) 1,000 unit tablet Take 1,000 Units by mouth daily.  fluticasone (FLONASE) 50 mcg/actuation nasal spray 2 Sprays by Both Nostrils route daily. 1 Bottle 0    ketorolac (TORADOL) 10 mg tablet Take 1 Tab by mouth every six (6) hours as needed for Pain. 30 Tab 0     Allergies   Allergen Reactions    Penicillin G Other (comments)     Told by parents, not sure    Shellfish Derived Angioedema     History reviewed. No pertinent family history. Social History     Tobacco Use    Smoking status: Former Smoker     Last attempt to quit: 1970     Years since quittin.5    Smokeless tobacco: Never Used   Substance Use Topics    Alcohol use: Yes     There is no problem list on file for this patient. Depression Risk Factor Screening:     3 most recent PHQ Screens 2019   Little interest or pleasure in doing things Not at all   Feeling down, depressed, irritable, or hopeless Not at all   Total Score PHQ 2 0     Alcohol Risk Factor Screening: You do not drink alcohol or very rarely. Functional Ability and Level of Safety:   Hearing Loss  Hearing is good. Activities of Daily Living  The home contains: no safety equipment. Patient does total self care    Fall Risk  Fall Risk Assessment, last 12 mths 2019   Able to walk? Yes   Fall in past 12 months?  No       Abuse Screen  Patient is not abused    Cognitive Screening Evaluation of Cognitive Function:  Has your family/caregiver stated any concerns about your memory: no  Normal    Patient Care Team   Patient Care Team:  Laurence Rodríguez MD as PCP - General (Internal Medicine)  Sally Bains MD as Gynecologist (Obstetrics & Gynecology)    Assessment/Plan   Education and counseling provided:  Are appropriate based on today's review and evaluation    Diagnoses and all orders for this visit:    1.  Medicare annual wellness visit, subsequent        Health Maintenance Due   Topic Date Due    Hepatitis C Screening  1950    Shingrix Vaccine Age 50> (1 of 2) 10/28/1999    FOBT Q 1 YEAR AGE 50-75  10/28/1999    GLAUCOMA SCREENING Q2Y  10/28/2014    Bone Densitometry (Dexa) Screening  10/28/2014    Pneumococcal 65+ years (1 of 2 - PCV13) 10/28/2014    MEDICARE YEARLY EXAM  03/20/2018

## 2019-05-21 LAB
25(OH)D3+25(OH)D2 SERPL-MCNC: 41.1 NG/ML (ref 30–100)
ALBUMIN SERPL-MCNC: 4.6 G/DL (ref 3.6–4.8)
ALBUMIN/GLOB SERPL: 1.8 {RATIO} (ref 1.2–2.2)
ALP SERPL-CCNC: 42 IU/L (ref 39–117)
ALT SERPL-CCNC: 15 IU/L (ref 0–32)
AST SERPL-CCNC: 16 IU/L (ref 0–40)
BILIRUB SERPL-MCNC: 0.2 MG/DL (ref 0–1.2)
BUN SERPL-MCNC: 17 MG/DL (ref 8–27)
BUN/CREAT SERPL: 18 (ref 12–28)
CALCIUM SERPL-MCNC: 9.6 MG/DL (ref 8.7–10.3)
CHLORIDE SERPL-SCNC: 101 MMOL/L (ref 96–106)
CHOLEST SERPL-MCNC: 201 MG/DL (ref 100–199)
CO2 SERPL-SCNC: 24 MMOL/L (ref 20–29)
CREAT SERPL-MCNC: 0.94 MG/DL (ref 0.57–1)
ERYTHROCYTE [DISTWIDTH] IN BLOOD BY AUTOMATED COUNT: 15.3 % (ref 12.3–15.4)
GLOBULIN SER CALC-MCNC: 2.5 G/DL (ref 1.5–4.5)
GLUCOSE SERPL-MCNC: 88 MG/DL (ref 65–99)
HCT VFR BLD AUTO: 39.6 % (ref 34–46.6)
HDLC SERPL-MCNC: 71 MG/DL
HGB BLD-MCNC: 12.8 G/DL (ref 11.1–15.9)
LDLC SERPL CALC-MCNC: 104 MG/DL (ref 0–99)
MAGNESIUM SERPL-MCNC: 2.1 MG/DL (ref 1.6–2.3)
MCH RBC QN AUTO: 27.4 PG (ref 26.6–33)
MCHC RBC AUTO-ENTMCNC: 32.3 G/DL (ref 31.5–35.7)
MCV RBC AUTO: 85 FL (ref 79–97)
PLATELET # BLD AUTO: 299 X10E3/UL (ref 150–450)
POTASSIUM SERPL-SCNC: 4.6 MMOL/L (ref 3.5–5.2)
PROT SERPL-MCNC: 7.1 G/DL (ref 6–8.5)
RBC # BLD AUTO: 4.68 X10E6/UL (ref 3.77–5.28)
SODIUM SERPL-SCNC: 143 MMOL/L (ref 134–144)
TRIGL SERPL-MCNC: 129 MG/DL (ref 0–149)
VLDLC SERPL CALC-MCNC: 26 MG/DL (ref 5–40)
WBC # BLD AUTO: 6.2 X10E3/UL (ref 3.4–10.8)

## 2019-05-29 ENCOUNTER — HOSPITAL ENCOUNTER (OUTPATIENT)
Dept: MAMMOGRAPHY | Age: 69
Discharge: HOME | End: 2019-05-29
Attending: FAMILY MEDICINE
Payer: MEDICARE

## 2019-05-29 DIAGNOSIS — Z13.820 SCREENING FOR OSTEOPOROSIS: ICD-10-CM

## 2019-05-29 DIAGNOSIS — Z78.0 POSTMENOPAUSAL: ICD-10-CM

## 2019-05-29 PROCEDURE — 77080 DXA BONE DENSITY AXIAL: CPT

## 2019-05-29 NOTE — PROGRESS NOTES
dexa scan shows early osteoporosis. Treatment to slow bone loss is recommended. Recommend weight bearing exercise, calcium 1200-1500mg daily, vitamin D3 2,000 iu daily,  Recommend starting Fosamax 70mg once a week. Taken on an empty stomach, stay upright, do not eat for 30 minutes after taken. Recheck DEXA scan in 2 years.

## 2019-05-31 ENCOUNTER — PATIENT MESSAGE (OUTPATIENT)
Dept: INTERNAL MEDICINE CLINIC | Age: 69
End: 2019-05-31

## 2019-05-31 RX ORDER — ALENDRONATE SODIUM 70 MG/1
70 TABLET ORAL
Qty: 4 TAB | Refills: 2 | Status: SHIPPED | OUTPATIENT
Start: 2019-05-31 | End: 2019-07-03

## 2019-06-29 ENCOUNTER — HOSPITAL ENCOUNTER (EMERGENCY)
Age: 69
Discharge: HOME | End: 2019-06-30
Attending: EMERGENCY MEDICINE
Payer: MEDICARE

## 2019-06-29 VITALS
RESPIRATION RATE: 14 BRPM | HEART RATE: 74 BPM | DIASTOLIC BLOOD PRESSURE: 59 MMHG | SYSTOLIC BLOOD PRESSURE: 107 MMHG | WEIGHT: 126 LBS | BODY MASS INDEX: 21.51 KG/M2 | OXYGEN SATURATION: 99 % | HEIGHT: 64 IN | TEMPERATURE: 97.6 F

## 2019-06-29 DIAGNOSIS — Z20.9 EXPOSURE TO BAT WITHOUT KNOWN BITE: Primary | ICD-10-CM

## 2019-06-29 PROCEDURE — 99282 EMERGENCY DEPT VISIT SF MDM: CPT

## 2019-06-29 PROCEDURE — 90471 IMMUNIZATION ADMIN: CPT

## 2019-06-29 PROCEDURE — 96372 THER/PROPH/DIAG INJ SC/IM: CPT

## 2019-06-30 PROCEDURE — 74011250636 HC RX REV CODE- 250/636: Performed by: PHYSICIAN ASSISTANT

## 2019-06-30 PROCEDURE — 90375 RABIES IG IM/SC: CPT | Performed by: PHYSICIAN ASSISTANT

## 2019-06-30 PROCEDURE — 96372 THER/PROPH/DIAG INJ SC/IM: CPT

## 2019-06-30 PROCEDURE — 90471 IMMUNIZATION ADMIN: CPT

## 2019-06-30 PROCEDURE — 90675 RABIES VACCINE IM: CPT | Performed by: PHYSICIAN ASSISTANT

## 2019-06-30 RX ADMIN — RABIES VACCINE 2.5 UNITS: KIT at 00:46

## 2019-06-30 RX ADMIN — RABIES IMMUNE GLOBULIN (HUMAN) 1140 UNITS: 300 INJECTION, SOLUTION INFILTRATION; INTRAMUSCULAR at 00:48

## 2019-06-30 NOTE — DISCHARGE INSTRUCTIONS
Patient Education   Your next vaccine is due on 7/3/19, 7/7/19, 7/14/19       Preventing Rabies Infection: Care Instructions  Your Care Instructions    Rabies is a disease caused by a virus that can affect the brain and nervous system. You can get rabies when you are exposed to an animal that has rabies. This can happen through a bite, scratch, or other contact. Your doctor can use two medicines to help your body fight the virus before it causes an infection. One is rabies immunoglobulin. It works by giving your body a type of protein called an antibody to stop the rabies virus. The other medicine is the rabies vaccine. It helps your body produce its own protection against the rabies virus. When you get these shots before serious symptoms appear, you should not get infected with rabies. Your doctor will give you a shot schedule. Make sure that you do not miss any doses. You need to get all the doses for the rabies vaccine to work. If you are exposed and have not been vaccinated against rabies, you should get 4 doses of rabies vaccine. · Get 1 dose right away. You should also get a rabies immunoglobulin shot when you get the first dose. · Get more doses on the 3rd, 7th, and 14th days. If you're exposed and have been vaccinated before, you should get 2 doses of rabies vaccine. · Get 1 dose right away. In this case, you do not need rabies immunoglobulin. · Get another on the 3rd day. You might also get a shot to prevent rabies if you handle animals often. Or you may get one if you plan to travel to places where rabies is a risk. Follow-up care is a key part of your treatment and safety. Be sure to make and go to all appointments, and call your doctor if you are having problems. It's also a good idea to know your test results and keep a list of the medicines you take. How can you care for yourself at home? · Do not drive or use machines if the rabies vaccine makes you dizzy.   · Both types of rabies shots may cause fever. And both may cause pain or stiffness where you got the shot. If your doctor recommends it, take an over-the-counter pain medicine, such as acetaminophen (Tylenol), ibuprofen (Advil, Motrin), or naproxen (Aleve), as needed. Read and follow all instructions on the label. · Do not take two or more pain medicines at the same time unless the doctor told you to. Many pain medicines have acetaminophen, which is Tylenol. Too much acetaminophen (Tylenol) can be harmful. To prevent contact with rabies  · Make sure your dog, cat, or ferret gets the rabies vaccine. · Avoid all contact with bats. · Never touch or try to pet or catch wild animals. This includes raccoons, skunks, foxes, and coyotes. · Secure trash and other items that attract animals. · Secure open areas of your home. Close off pet doors, chimneys, unscreened windows, or any place that wild or stray animals could get in. · Never handle a dead or wounded animal.  To take care of an animal bite  · Wash any animal bite or area of exposure right away. Use soap and water. · Call your doctor to find out how to care for your wound. · If the animal is a dog, cat, or pet ferret, try to find and contact the owner. If you can't find the owner, call the local animal control to safely catch the animal.  · If the animal is wild, do not try to catch or kill it. Find out what type of animal it is. Note whether it is acting normal. Report it to the local animal control. · Contact the local or state health department to report a bite or a severe scratch from an animal.  · Ask your doctor if you need a tetanus shot. When should you call for help? Watch closely for changes in your health, and be sure to contact your doctor if you have any problems. Where can you learn more? Go to http://franco-ele.info/. Enter U105 in the search box to learn more about \"Preventing Rabies Infection: Care Instructions. \"  Current as of: July 30, 2018  Content Version: 11.9  © 9457-9172 [employer]. Care instructions adapted under license by [employer] (which disclaims liability or warranty for this information). If you have questions about a medical condition or this instruction, always ask your healthcare professional. [address] any warranty or liability for your use of this information.

## 2019-06-30 NOTE — ED TRIAGE NOTES
Patient was cleaning and had several bats at her feet, mouths open.   Denies knowledge of bite, concerned for possible rabies exposure

## 2019-06-30 NOTE — ED PROVIDER NOTES
69 yo  female presenting for evaluation following bat exposure. She reports hearing some noises in the basement. Went down in basement and noticed two bats with open mouths around her exposed ankle. No recognized scratches or bites. In usual state of health otherwise. No fever, headache, sore throat, cough, rhinorrhea, sneezing, SOB, abdominal pain, nausea, vomiting, or urinary complaints. No past medical history on file. Past Surgical History:   Procedure Laterality Date    COLONOSCOPY N/A 2016    COLONOSCOPY performed by Jesus Jeans, MD at [address] [facility]      ovarian cyst, as teen, one ovary removed ? right. Family History:   Problem Relation Age of Onset    Cancer Mother         cervical    Hypertension Mother    Nam Coppersmith Bladder Disease Mother     Heart Disease Father     No Known Problems Sister     No Known Problems Brother        Social History     Socioeconomic History    Marital status:      Spouse name: Not on file    Number of children: Not on file    Years of education: Not on file    Highest education level: Not on file   Occupational History    Not on file   Social Needs    Financial resource strain: Not on file    Food insecurity:     Worry: Not on file     Inability: Not on file    Transportation needs:     Medical: Not on file     Non-medical: Not on file   Tobacco Use    Smoking status: Former Smoker     Last attempt to quit: 1970     Years since quittin.10    Smokeless tobacco: Never Used   Substance and Sexual Activity    Alcohol use:  Yes    Drug use: No    Sexual activity: Not Currently     Partners: Male   Lifestyle    Physical activity:     Days per week: Not on file     Minutes per session: Not on file    Stress: Not on file   Relationships    Social connections:     Talks on phone: Not on file     Gets together: Not on file     Attends Holiness service: Not on file     Active member of club or organization: Not on file     Attends meetings of clubs or organizations: Not on file     Relationship status: Not on file    Intimate partner violence:     Fear of current or ex partner: Not on file     Emotionally abused: Not on file     Physically abused: Not on file     Forced sexual activity: Not on file   Other Topics Concern    Not on file   Social History Narrative    Not on file         ALLERGIES: Penicillin g and Shellfish derived    Review of Systems   Constitutional: Negative. Negative for chills, fatigue and fever. HENT: Negative. Negative for congestion, ear pain, facial swelling, rhinorrhea, sneezing and sore throat. Respiratory: Negative for cough and shortness of breath. Cardiovascular: Negative. Negative for chest pain. Gastrointestinal: Negative. Negative for abdominal pain, constipation, diarrhea, nausea and vomiting. Genitourinary: Negative for difficulty urinating, frequency and urgency. Neurological: Negative for headaches. Psychiatric/Behavioral: Negative for confusion and decreased concentration. All other systems reviewed and are negative. Vitals:    06/29/19 2230   Pulse: 88   SpO2: 96%            Physical Exam   Constitutional: She is oriented to person, place, and time. She appears well-developed and well-nourished. No distress. Well appearing  female in NAD   HENT:   Head: Normocephalic and atraumatic. Right Ear: External ear normal.   Left Ear: External ear normal.   Nose: Nose normal.   Mouth/Throat: Oropharynx is clear and moist. No oropharyngeal exudate. Eyes: Pupils are equal, round, and reactive to light. Conjunctivae are normal.   Neck: Normal range of motion. Neck supple. Cardiovascular: Normal rate, regular rhythm and normal heart sounds. Pulmonary/Chest: Effort normal. No respiratory distress. She has no wheezes. Abdominal: Soft. Bowel sounds are normal. She exhibits no distension. There is no tenderness. There is no rebound. Musculoskeletal: Normal range of motion. Neurological: She is alert and oriented to person, place, and time. Skin: Skin is warm and dry. No ecchymosis, no laceration and no lesion noted. No abrasions or skin breaks noted   Nursing note and vitals reviewed. MDM  Number of Diagnoses or Management Options  Exposure to bat without known bite:   Diagnosis management comments: 69 yo  female presenting following bat exposure. No obvious bite or abrasions. Plan  Rabies vaccine. Lilliam MoreiraArvin [place]           Procedures      Progress note      Patient's results have been reviewed with them. Patient and/or family have verbally conveyed their understanding and agreement of the patient's signs, symptoms, diagnosis, treatment and prognosis and additionally agree to follow up as recommended or return to the Emergency Room should their condition change prior to follow-up. Discharge instructions have also been provided to the patient with some educational information regarding their diagnosis as well a list of reasons why they would want to return to the ER prior to their follow-up appointment should their condition change.  Lilliam Preciado [place]

## 2019-07-03 ENCOUNTER — HOSPITAL ENCOUNTER (OUTPATIENT)
Dept: INFUSION THERAPY | Age: 69
Discharge: HOME | End: 2019-07-03
Payer: MEDICARE

## 2019-07-03 VITALS
TEMPERATURE: 98.1 F | SYSTOLIC BLOOD PRESSURE: 109 MMHG | DIASTOLIC BLOOD PRESSURE: 59 MMHG | RESPIRATION RATE: 18 BRPM | HEART RATE: 70 BPM

## 2019-07-03 PROCEDURE — 90471 IMMUNIZATION ADMIN: CPT

## 2019-07-03 PROCEDURE — 74011250636 HC RX REV CODE- 250/636: Performed by: PHYSICIAN ASSISTANT

## 2019-07-03 PROCEDURE — 96372 THER/PROPH/DIAG INJ SC/IM: CPT

## 2019-07-03 PROCEDURE — 90675 RABIES VACCINE IM: CPT | Performed by: PHYSICIAN ASSISTANT

## 2019-07-03 RX ADMIN — RABIES VACCINE 2.5 UNITS: KIT at 10:55

## 2019-07-03 NOTE — PROGRESS NOTES
Outpatient Infusion Center Short Visit Progress Note    3613 Pt admit to [facility] for Rabies Day 3 ambulatory in stable condition. Assessment completed. No new concerns voiced. Visit Vitals  /59 (BP 1 Location: Right arm, BP Patient Position: At rest)   Pulse 70   Temp 98.1 °F (36.7 °C)   Resp 18         Medications:  Rabavert vaccine 2.5 units in left Deltoid    1100 Pt tolerated treatment well. D/c home ambulatory in no distress.  Pt aware of next appointment scheduled for 7/14/19

## 2019-07-07 ENCOUNTER — HOSPITAL ENCOUNTER (OUTPATIENT)
Dept: INFUSION THERAPY | Age: 69
Discharge: HOME | End: 2019-07-07
Payer: MEDICARE

## 2019-07-07 VITALS
TEMPERATURE: 97 F | DIASTOLIC BLOOD PRESSURE: 58 MMHG | RESPIRATION RATE: 18 BRPM | SYSTOLIC BLOOD PRESSURE: 123 MMHG | HEART RATE: 62 BPM

## 2019-07-07 PROCEDURE — 90471 IMMUNIZATION ADMIN: CPT

## 2019-07-07 PROCEDURE — 74011250636 HC RX REV CODE- 250/636: Performed by: PHYSICIAN ASSISTANT

## 2019-07-07 PROCEDURE — 90675 RABIES VACCINE IM: CPT | Performed by: PHYSICIAN ASSISTANT

## 2019-07-07 RX ADMIN — RABIES VACCINE 2.5 UNITS: KIT at 10:34

## 2019-07-07 NOTE — PROGRESS NOTES
Outpatient Infusion Center Short Visit Progress Note    6067 Pt admit to [facility] for Rabies Day 7 ambulatory in stable condition. Assessment completed. No new concerns voiced. Visit Vitals  /58 (BP 1 Location: Left arm, BP Patient Position: At rest)   Pulse 62   Temp 97 °F (36.1 °C)   Resp 18         Medications:  Rabavert vaccine 2.5 units in left Deltoid    1045 Pt tolerated treatment well. D/c home ambulatory in no distress.  Pt aware of next appointment scheduled for 7/14/19

## 2019-07-14 ENCOUNTER — HOSPITAL ENCOUNTER (OUTPATIENT)
Dept: INFUSION THERAPY | Age: 69
Discharge: HOME | End: 2019-07-14
Payer: MEDICARE

## 2019-07-14 VITALS
TEMPERATURE: 96.6 F | DIASTOLIC BLOOD PRESSURE: 78 MMHG | SYSTOLIC BLOOD PRESSURE: 133 MMHG | RESPIRATION RATE: 18 BRPM | HEART RATE: 81 BPM

## 2019-07-14 PROCEDURE — 90471 IMMUNIZATION ADMIN: CPT

## 2019-07-14 PROCEDURE — 90675 RABIES VACCINE IM: CPT | Performed by: EMERGENCY MEDICINE

## 2019-07-14 PROCEDURE — 74011250636 HC RX REV CODE- 250/636: Performed by: EMERGENCY MEDICINE

## 2019-07-14 PROCEDURE — 96372 THER/PROPH/DIAG INJ SC/IM: CPT

## 2019-07-14 RX ADMIN — RABIES VACCINE 2.5 UNITS: KIT at 08:44

## 2019-07-14 NOTE — PROGRESS NOTES
Outpatient Infusion Center Progress Note    0840 Pt admit to [facility] for Rabies Vaccine ambulatory in stable condition. Assessment completed. No new concerns voiced. Visit Vitals  /78   Pulse 81   Temp 96.6 °F (35.9 °C)   Resp 18       Medications:  Medications Administered     rabies vaccine, PCEC (RABAVERT) kit 2.5 Units     Admin Date  07/14/2019 Action  Given Dose  2.5 Units Route  IntraMUSCular Administered By  Levonia Gamal A              Injection given in right arm.    0850 Pt tolerated treatment well. D/c home ambulatory in no distress.

## 2019-08-27 ENCOUNTER — TELEPHONE (OUTPATIENT)
Dept: INTERNAL MEDICINE CLINIC | Age: 69
End: 2019-08-27

## 2019-08-27 NOTE — TELEPHONE ENCOUNTER
Spoke with patient. Two pt identifiers confirmed. Patient offered an appointment with Dr. Frederick Woods on 09/04/19. Appointment accepted. Patient advised if anything changes or if unable to keep this appointment to call the office  Pt verbalized understanding of information discussed w/ no further questions at this time.

## 2019-08-27 NOTE — TELEPHONE ENCOUNTER
#[phone] pt has had chronic diarrhea for several months and needs to be seen real soon. Please call to schedule.

## 2019-09-04 ENCOUNTER — OFFICE VISIT (OUTPATIENT)
Dept: INTERNAL MEDICINE CLINIC | Age: 69
End: 2019-09-04

## 2019-09-04 VITALS
RESPIRATION RATE: 18 BRPM | BODY MASS INDEX: 22.02 KG/M2 | DIASTOLIC BLOOD PRESSURE: 72 MMHG | WEIGHT: 129 LBS | HEIGHT: 64 IN | SYSTOLIC BLOOD PRESSURE: 111 MMHG | TEMPERATURE: 97.7 F | OXYGEN SATURATION: 98 % | HEART RATE: 70 BPM

## 2019-09-04 DIAGNOSIS — K52.9 CHRONIC DIARRHEA: Primary | ICD-10-CM

## 2019-09-04 NOTE — PROGRESS NOTES
Arsh Bustamante is a 70 y.o. female who presents with chronic diarrhea. On and off for 5-6 years. This time it has been for the past 6 months. Taking imodium one at night regularly. Reviewed diet:  Breakfast:  Raisin toast, coffee 1/2 cup recently, and OJ. Snack- almonds, berries. Dinner- meat, veggie. Drinks water and gingerale with stevia. Taking probiotic. No fever. No nausea or vomiting. Prior colonoscopy , Dr. Abimael Mckeon. Negative. Normal labs in May 2019. On bioidentical hormones. Sleeping better. History reviewed. No pertinent past medical history. Family History   Problem Relation Age of Onset    Cancer Mother         cervical    Hypertension Mother    Lorren Gary Bladder Disease Mother     Heart Disease Father     No Known Problems Sister     No Known Problems Brother        Social History     Socioeconomic History    Marital status:      Spouse name: Not on file    Number of children: Not on file    Years of education: Not on file    Highest education level: Not on file   Occupational History    Not on file   Social Needs    Financial resource strain: Not on file    Food insecurity:     Worry: Not on file     Inability: Not on file    Transportation needs:     Medical: Not on file     Non-medical: Not on file   Tobacco Use    Smoking status: Former Smoker     Last attempt to quit: 1970     Years since quittin.8    Smokeless tobacco: Never Used   Substance and Sexual Activity    Alcohol use:  Yes    Drug use: No    Sexual activity: Not Currently     Partners: Male   Lifestyle    Physical activity:     Days per week: Not on file     Minutes per session: Not on file    Stress: Not on file   Relationships    Social connections:     Talks on phone: Not on file     Gets together: Not on file     Attends Sabianist service: Not on file     Active member of club or organization: Not on file     Attends meetings of clubs or organizations: Not on file Relationship status: Not on file    Intimate partner violence:     Fear of current or ex partner: Not on file     Emotionally abused: Not on file     Physically abused: Not on file     Forced sexual activity: Not on file   Other Topics Concern    Not on file   Social History Narrative    Not on file       Current Outpatient Medications on File Prior to Visit   Medication Sig Dispense Refill    cholecalciferol (VITAMIN D3) 1,000 unit tablet Take 1,000 Units by mouth daily.  rabies vaccine, PCEC (RABAVERT) 2.5 unit injection Day 0 -  6/30/2019 - Given in ED  (Provider will fill in date):       Day 3 -   7/03/2019      Day 7-    07/07/2019     Day 14-  07/14/2019   Indication: Rabies Exposure  Prescription date: 6/30/2019 1 mL 2     No current facility-administered medications on file prior to visit. Review of Systems  Pertinent items are noted in HPI. Objective:     Visit Vitals  /72 (BP 1 Location: Left arm, BP Patient Position: Sitting)   Pulse 70   Temp 97.7 °F (36.5 °C) (Oral)   Resp 18   Ht 5' 4\" (1.626 m)   Wt 129 lb (58.5 kg)   SpO2 98%   BMI 22.14 kg/m²     Gen: well appearing female  HEENT:   PERRL,normal conjunctiva. OP no erythema, no exudates, MMM  Neck:  No masses or LAD  Resp:  No wheezing, no rhonchi, no rales. CV:  RRR, normal S1S2, no murmur. GI: soft, nontender, without masses. Extrem:  +2 pulses, no edema, warm distally      Assessment/Plan:       ICD-10-CM ICD-9-CM    1. Chronic diarrhea K52.9 787.91        AVOID FATS, DAIRY, UNCOOKED VEGGIES,  ARTIFICIAL SWEETENERS, COFFEE. TRY FODMAPS DIET. KEEP A FOOD DIARY. Follow-up and Dispositions    · Return if symptoms worsen or fail to improve.          Marcello Ayon MD

## 2019-09-04 NOTE — PATIENT INSTRUCTIONS
Office Policies    Phone calls/patient messages:            Please allow up to 24 hours for someone in the office to contact you about your call or message. Be mindful your provider may be out of the office or your message may require further review. We encourage you to use BoxCat for your messages as this is a faster, more efficient way to communicate with our office                         Medication Refills:            Prescription medications require 48-72 business hours to process. We encourage you to use BoxCat for your refills. For controlled medications: Please allow 72 business hours to process. Certain medications may require you to  a written prescription at our office. NO narcotic/controlled medications will be prescribed after 4pm Monday through Friday or on weekends              Form/Paperwork Completion:            Please note a $25 fee may incur for all paperwork for completed by our providers. We ask that you allow 7-10 business days. Pre-payment is due prior to picking up/faxing the completed form. You may also download your forms to BoxCat to have your doctor print off. AVOID FATS, DAIRY, UNCOOKED VEGGIES,  ARTIFICIAL SWEETENERS, COFFEE. TRY FODMAPS DIET. KEEP A FOOD DIARY.

## 2019-09-05 ENCOUNTER — TELEPHONE (OUTPATIENT)
Dept: INTERNAL MEDICINE CLINIC | Age: 69
End: 2019-09-05

## 2019-09-05 NOTE — TELEPHONE ENCOUNTER
Pt states this is the gastro specialist she had seen in the past.    Dr. Rodríguez Velasquez ([address] [address]) their   #[phone]

## 2020-01-14 ENCOUNTER — HOSPITAL ENCOUNTER (EMERGENCY)
Age: 70
Discharge: HOME | End: 2020-01-14
Attending: EMERGENCY MEDICINE | Admitting: EMERGENCY MEDICINE
Payer: MEDICARE

## 2020-01-14 ENCOUNTER — APPOINTMENT (OUTPATIENT)
Dept: GENERAL RADIOLOGY | Age: 70
End: 2020-01-14
Attending: PHYSICIAN ASSISTANT
Payer: MEDICARE

## 2020-01-14 VITALS
TEMPERATURE: 98.2 F | RESPIRATION RATE: 16 BRPM | DIASTOLIC BLOOD PRESSURE: 68 MMHG | SYSTOLIC BLOOD PRESSURE: 137 MMHG | OXYGEN SATURATION: 98 % | HEART RATE: 78 BPM

## 2020-01-14 DIAGNOSIS — M25.572 ACUTE LEFT ANKLE PAIN: Primary | ICD-10-CM

## 2020-01-14 PROCEDURE — 74011250637 HC RX REV CODE- 250/637: Performed by: PHYSICIAN ASSISTANT

## 2020-01-14 PROCEDURE — 99284 EMERGENCY DEPT VISIT MOD MDM: CPT

## 2020-01-14 PROCEDURE — 99283 EMERGENCY DEPT VISIT LOW MDM: CPT

## 2020-01-14 PROCEDURE — 73610 X-RAY EXAM OF ANKLE: CPT

## 2020-01-14 RX ORDER — NAPROXEN 500 MG/1
500 TABLET ORAL
Qty: 10 TAB | Refills: 0 | Status: SHIPPED | OUTPATIENT
Start: 2020-01-14 | End: 2020-01-19

## 2020-01-14 RX ORDER — HYDROCODONE BITARTRATE AND ACETAMINOPHEN 5; 325 MG/1; MG/1
1 TABLET ORAL
Status: COMPLETED | OUTPATIENT
Start: 2020-01-14 | End: 2020-01-14

## 2020-01-14 RX ADMIN — HYDROCODONE BITARTRATE AND ACETAMINOPHEN 1 TABLET: 5; 325 TABLET ORAL at 16:07

## 2020-01-14 NOTE — DISCHARGE INSTRUCTIONS
Patient Education        Foot Pain: Care Instructions  Your Care Instructions  Foot injuries that cause pain and swelling are fairly common. Almost all sports or home repair projects can cause a misstep that ends up as foot pain. Normal wear and tear, especially as you get older, also can cause foot pain. Most minor foot injuries will heal on their own, and home treatment is usually all you need to do. If you have a severe injury, you may need tests and treatment. Follow-up care is a key part of your treatment and safety. Be sure to make and go to all appointments, and call your doctor if you are having problems. It's also a good idea to know your test results and keep a list of the medicines you take. How can you care for yourself at home? · Take pain medicines exactly as directed. ? If the doctor gave you a prescription medicine for pain, take it as prescribed. ? If you are not taking a prescription pain medicine, ask your doctor if you can take an over-the-counter medicine. · Rest and protect your foot. Take a break from any activity that may cause pain. · Put ice or a cold pack on your foot for 10 to 20 minutes at a time. Put a thin cloth between the ice and your skin. · Prop up the sore foot on a pillow when you ice it or anytime you sit or lie down during the next 3 days. Try to keep it above the level of your heart. This will help reduce swelling. · Your doctor may recommend that you wrap your foot with an elastic bandage. Keep your foot wrapped for as long as your doctor advises. · If your doctor recommends crutches, use them as directed. · Wear roomy footwear. · As soon as pain and swelling end, begin gentle exercises of your foot. Your doctor can tell you which exercises will help. When should you call for help? Call 911 anytime you think you may need emergency care.  For example, call if:    · Your foot turns pale, white, blue, or cold.    Call your doctor now or seek immediate medical care if:    · You cannot move or stand on your foot.     · Your foot looks twisted or out of its normal position.     · Your foot is not stable when you step down.     · You have signs of infection, such as:  ? Increased pain, swelling, warmth, or redness. ? Red streaks leading from the sore area. ? Pus draining from a place on your foot. ? A fever.     · Your foot is numb or tingly.    Watch closely for changes in your health, and be sure to contact your doctor if:    · You do not get better as expected.     · You have bruises from an injury that last longer than 2 weeks. Where can you learn more? Go to http://franco-ele.info/. Enter N036 in the search box to learn more about \"Foot Pain: Care Instructions. \"  Current as of: June 26, 2019  Content Version: 12.2  © 8385-1652 [employer]. Care instructions adapted under license by [employer] (which disclaims liability or warranty for this information). If you have questions about a medical condition or this instruction, always ask your healthcare professional. [place] [address] any warranty or liability for your use of this information. Patient Education        Learning About RICE (Rest, Ice, Compression, and Elevation)  What is RICE? RICE is a way to care for an injury. RICE helps relieve pain and swelling. It may also help with healing and flexibility. RICE stands for:  · Rest and protect the injured or sore area. · Ice or a cold pack used as soon as possible. · Compression, or wrapping the injured or sore area with an elastic bandage. · Elevation (propping up) the injured or sore area. How do you do RICE? You can use RICE for home treatment when you have general aches and pains or after an injury or surgery. Rest  · Do not put weight on the injury for at least 24 to 48 hours. · Use crutches for a badly sprained knee or ankle.   · Support a sprained wrist, elbow, or shoulder with a sling.  Ice  · Put ice or a cold pack on the injury right away to reduce pain and swelling. Frozen vegetables will also work as an ice pack. Put a thin cloth between the ice or cold pack and your skin. The cloth protects the injured area from getting too cold. · Use ice for 10 to 15 minutes at a time for the first 48 to 72 hours. Compression  · Use compression for sprains, strains, and surgeries of the arms and legs. · Wrap the injured area with an elastic bandage or compression sleeve to reduce swelling. · Don't wrap it too tightly. If the area below it feels numb, tingles, or feels cool, loosen the wrap. Elevation  · Use elevation for areas of the body that can be propped up, such as arms and legs. · Prop up the injured area on pillows whenever you use ice. Keep it propped up anytime you sit or lie down. · Try to keep the injured area at or above the level of your heart. This will help reduce swelling and bruising. Where can you learn more? Go to http://franco-ele.info/. Enter P301 in the search box to learn more about \"Learning About RICE (Rest, Ice, Compression, and Elevation). \"  Current as of: June 26, 2019  Content Version: 12.2  © 6489-6804 [employer]. Care instructions adapted under license by [employer] (which disclaims liability or warranty for this information). If you have questions about a medical condition or this instruction, always ask your healthcare professional. [place] [address] any warranty or liability for your use of this information.

## 2020-01-14 NOTE — ED PROVIDER NOTES
78year old female hx Hep A in teenage years presenting for ankle pain. Pt notes that about 4 days ago she had an episode of left ankle pain that lasted a few hours, then resolved. Notes that it happened again today, started at about 12:30PM, notes that she was standing when it started. Took 2 excedrin without relief. Notes that she had something similar happen once about a year ago that also resolved without intervention. No fever or chills. No hx gout. No recent increase in activity. Cannot tell if it is swollen. No calf pain. Patient denies hx VTE, recent immobilization, exogenous estrogen use. Past medical history: As above               Past Medical History:   Diagnosis Date    Hepatitis A        Past Surgical History:   Procedure Laterality Date    COLONOSCOPY N/A 2016    COLONOSCOPY performed by Piero Gonzalez MD at [address] [facility]      ovarian cyst, as teen, one ovary removed ? right. Family History:   Problem Relation Age of Onset    Cancer Mother         cervical    Hypertension Mother    [place]li[place] Seen Bladder Disease Mother     Heart Disease Father     No Known Problems Sister     No Known Problems Brother        Social History     Socioeconomic History    Marital status:      Spouse name: Not on file    Number of children: Not on file    Years of education: Not on file    Highest education level: Not on file   Occupational History    Not on file   Social Needs    Financial resource strain: Not on file    Food insecurity:     Worry: Not on file     Inability: Not on file    Transportation needs:     Medical: Not on file     Non-medical: Not on file   Tobacco Use    Smoking status: Former Smoker     Last attempt to quit: 1970     Years since quittin.2    Smokeless tobacco: Never Used   Substance and Sexual Activity    Alcohol use:  Yes    Drug use: No    Sexual activity: Not Currently     Partners: Male   Lifestyle    Physical activity: Days per week: Not on file     Minutes per session: Not on file    Stress: Not on file   Relationships    Social connections:     Talks on phone: Not on file     Gets together: Not on file     Attends Congregation service: Not on file     Active member of club or organization: Not on file     Attends meetings of clubs or organizations: Not on file     Relationship status: Not on file    Intimate partner violence:     Fear of current or ex partner: Not on file     Emotionally abused: Not on file     Physically abused: Not on file     Forced sexual activity: Not on file   Other Topics Concern    Not on file   Social History Narrative    Not on file         ALLERGIES: Penicillin g and Shellfish derived    Review of Systems   Constitutional: Negative for fever. HENT: Negative for facial swelling. Respiratory: Negative for shortness of breath. Cardiovascular: Negative for chest pain. Gastrointestinal: Negative for vomiting. Musculoskeletal: Positive for arthralgias. Skin: Negative for wound. Neurological: Negative for syncope. All other systems reviewed and are negative. Vitals:    01/14/20 1436   BP: 137/68   Pulse: 78   Resp: 16   Temp: 98.2 °F (36.8 °C)   SpO2: 98%            Physical Exam  Vitals signs and nursing note reviewed. Constitutional:       Appearance: She is well-developed. Comments: Well-appearing white female   HENT:      Head: Normocephalic. Eyes:      Conjunctiva/sclera: Conjunctivae normal.   Neck:      Musculoskeletal: Neck supple. Cardiovascular:      Rate and Rhythm: Normal rate. Pulmonary:      Effort: Pulmonary effort is normal. No respiratory distress. Musculoskeletal: Normal range of motion. Comments: Left ankle: No erythema, warmth. Mild swelling noted just anterior to the lateral malleolus with minimal tenderness, no bony tenderness. Normal range of motion of the joint. Pedal pulses intact. Normal sensation. No calf tenderness.    Skin: General: Skin is warm and dry. Neurological:      Mental Status: She is alert and oriented to person, place, and time. MDM  Number of Diagnoses or Management Options  Acute left ankle pain:   Diagnosis management comments: 26-year-old female presenting to the ER for second episode of atraumatic left ankle pain this week. No erythema, warmth, fever, loss of range of motion concerning for septic or gouty arthritis. Small area of swelling over the soft tissue, discussed possible tendinitis, ganglion cyst, etc.  No findings on x-ray. Encouraged use of immobilizer, rice, Ortho follow-up as needed. Will write for 5 days of Naprosyn.        Amount and/or Complexity of Data Reviewed  Tests in the radiology section of CPT®: ordered and reviewed  Discuss the patient with other providers: yes (Dr. Selena Galeas, ED attending)           Procedures

## 2020-01-14 NOTE — ED TRIAGE NOTES
Pt stated she had sudden onset of left lateral malleolus pain intermittently, denies swelling, denies injury, denies fever or chills, pt stated she was unable to walk on it

## 2020-01-16 NOTE — PROGRESS NOTES
[facility]  Physical Therapy consult received and appreciated. Chart reviewed. Met with patient. Patient awaiting brace. Per nursing, they will apply. Physical Therapy will check back. Checked back. Patient discharged, no longer in the ED.

## 2020-03-18 ENCOUNTER — TELEPHONE (OUTPATIENT)
Dept: INTERNAL MEDICINE CLINIC | Age: 70
End: 2020-03-18

## 2020-03-18 DIAGNOSIS — K52.9 CHRONIC DIARRHEA: Primary | ICD-10-CM

## 2020-03-18 NOTE — TELEPHONE ENCOUNTER
Spoke with patient. Two pt identifiers confirmed. Patient states that she spoke with her insurance company and the name that she was given was Dr. Eren Fisher. Patient would like to know if she can get her referral changed. Advised patient that I will send this information to Dr. Prema Giordano and will give her a call once the new referral has been done. Pt verbalized understanding of information discussed w/ no further questions at this time.

## 2020-03-18 NOTE — TELEPHONE ENCOUNTER
Spoke with patient. \  Two pt identifiers confirmed. Patient advised that her referral has been faxed and confirmation has been received. Pt verbalized understanding of information discussed w/ no further questions at this time.

## 2020-03-18 NOTE — TELEPHONE ENCOUNTER
Pt states she gave you the wrong address to send the referral to for Dr. Yazmin Lizarraga.     She wants to see him here at [facility] [address]   Phone #[phone]   Fax #[phone]

## 2020-03-18 NOTE — TELEPHONE ENCOUNTER
#[phone] pt states she needs a new referral to a GI specialist as the one you referred her to does not take her insurance.    Pt is asking for another referral.     Please call

## 2020-06-15 ENCOUNTER — HOSPITAL ENCOUNTER (OUTPATIENT)
Dept: MAMMOGRAPHY | Age: 70
Discharge: HOME | End: 2020-06-15
Attending: FAMILY MEDICINE
Payer: MEDICARE

## 2020-06-15 DIAGNOSIS — Z12.31 ENCOUNTER FOR SCREENING MAMMOGRAM FOR MALIGNANT NEOPLASM OF BREAST: ICD-10-CM

## 2020-06-15 PROCEDURE — 77067 SCR MAMMO BI INCL CAD: CPT

## 2020-12-15 NOTE — PATIENT INSTRUCTIONS
Medicare Wellness Visit, Female     The best way to live healthy is to have a lifestyle where you eat a well-balanced diet, exercise regularly, limit alcohol use, and quit all forms of tobacco/nicotine, if applicable. Regular preventive services are another way to keep healthy. Preventive services (vaccines, screening tests, monitoring & exams) can help personalize your care plan, which helps you manage your own care. Screening tests can find health problems at the earliest stages, when they are easiest to treat. [address] follows the current, evidence-based guidelines published by the [facility] Adalid Rahel ([facility]) when recommending preventive services for our patients. Because we follow these guidelines, sometimes recommendations change over time as research supports it. (For example, mammograms used to be recommended annually. Even though Medicare will still pay for an annual mammogram, the newer guidelines recommend a mammogram every two years for women of average risk.)  Of course, you and your doctor may decide to screen more often for some diseases, based on your risk and your health status. Preventive services for you include:  - Medicare offers their members a free annual wellness visit, which is time for you and your primary care provider to discuss and plan for your preventive service needs. Take advantage of this benefit every year!  -All adults over the age of 72 should receive the recommended pneumonia vaccines. Current USPSTF guidelines recommend a series of two vaccines for the best pneumonia protection.   -All adults should have a flu vaccine yearly and a tetanus vaccine every 10 years. All adults age 48 and older should receive a shingles vaccine once in their lifetime.    -A bone mass density test is recommended when a woman turns 65 to screen for osteoporosis. This test is only recommended one time, as a screening.  Some providers will use this same test as a disease monitoring tool if you already have osteoporosis. -All adults age 38-68 who are overweight should have a diabetes screening test once every three years.   -Other screening tests and preventive services for persons with diabetes include: an eye exam to screen for diabetic retinopathy, a kidney function test, a foot exam, and stricter control over your cholesterol.   -Cardiovascular screening for adults with routine risk involves an electrocardiogram (ECG) at intervals determined by your doctor.   -Colorectal cancer screenings should be done for adults age 54-65 with no increased risk factors for colorectal cancer. There are a number of acceptable methods of screening for this type of cancer. Each test has its own benefits and drawbacks. Discuss with your doctor what is most appropriate for you during your annual wellness visit. The different tests include: colonoscopy (considered the best screening method), a fecal occult blood test, a fecal DNA test, and sigmoidoscopy. -Breast cancer screenings are recommended every other year for women of normal risk, age 54-69.  -Cervical cancer screenings for women over age 72 are only recommended with certain risk factors.   -All adults born between [facility] should be screened once for Hepatitis C. Here is a list of your current Health Maintenance items (your personalized list of preventive services) with a due date:  Health Maintenance Due   Topic Date Due    Hepatitis C Test  1950    Shingles Vaccine (1 of 2) 10/28/1999    Annual Well Visit  05/20/2020    Yearly Flu Vaccine (1) 09/01/2020    Pneumococcal Vaccine (2 of 2 - PPSV23) 09/26/2020         Medicare Wellness Visit, Female     The best way to live healthy is to have a lifestyle where you eat a well-balanced diet, exercise regularly, limit alcohol use, and quit all forms of tobacco/nicotine, if applicable. Regular preventive services are another way to keep healthy.  Preventive services (vaccines, screening tests, monitoring & exams) can help personalize your care plan, which helps you manage your own care. Screening tests can find health problems at the earliest stages, when they are easiest to treat. [facility] follows the current, evidence-based guidelines published by the [facility] Adalid Mcginnis ([facility]) when recommending preventive services for our patients. Because we follow these guidelines, sometimes recommendations change over time as research supports it. (For example, mammograms used to be recommended annually. Even though Medicare will still pay for an annual mammogram, the newer guidelines recommend a mammogram every two years for women of average risk). Of course, you and your doctor may decide to screen more often for some diseases, based on your risk and your co-morbidities (chronic disease you are already diagnosed with). Preventive services for you include:  - Medicare offers their members a free annual wellness visit, which is time for you and your primary care provider to discuss and plan for your preventive service needs. Take advantage of this benefit every year!  -All adults over the age of 72 should receive the recommended pneumonia vaccines. Current USPSTF guidelines recommend a series of two vaccines for the best pneumonia protection.   -All adults should have a flu vaccine yearly and a tetanus vaccine every 10 years.   -All adults age 48 and older should receive the shingles vaccines (series of two vaccines). -All adults age 38-68 who are overweight should have a diabetes screening test once every three years.   -All adults born between [address] and 1966 should be screened once for Hepatitis C.  -Other screening tests and preventive services for persons with diabetes include: an eye exam to screen for diabetic retinopathy, a kidney function test, a foot exam, and stricter control over your cholesterol. -Cardiovascular screening for adults with routine risk involves an electrocardiogram (ECG) at intervals determined by your doctor.   -Colorectal cancer screenings should be done for adults age 54-65 with no increased risk factors for colorectal cancer. There are a number of acceptable methods of screening for this type of cancer. Each test has its own benefits and drawbacks. Discuss with your doctor what is most appropriate for you during your annual wellness visit. The different tests include: colonoscopy (considered the best screening method), a fecal occult blood test, a fecal DNA test, and sigmoidoscopy.    -A bone mass density test is recommended when a woman turns 65 to screen for osteoporosis. This test is only recommended one time, as a screening. Some providers will use this same test as a disease monitoring tool if you already have osteoporosis. -Breast cancer screenings are recommended every other year for women of normal risk, age 54-69.  -Cervical cancer screenings for women over age 72 are only recommended with certain risk factors. H  Office Policies    Phone calls/patient messages:            Please allow up to 24 hours for someone in the office to contact you about your call or message. Be mindful your provider may be out of the office or your message may require further review. We encourage you to use Livescribe for your messages as this is a faster, more efficient way to communicate with our office                         Medication Refills:            Prescription medications require 48-72 business hours to process. We encourage you to use Livescribe for your refills. For controlled medications: Please allow 72 business hours to process. Certain medications may require you to  a written prescription at our office.             NO narcotic/controlled medications will be prescribed after 4pm Monday through Friday or on weekends              Form/Paperwork Completion:            Please note a $25 fee may incur for all paperwork for completed by our providers. We ask that you allow 7-10 business days. Pre-payment is due prior to picking up/faxing the completed form. You may also download your forms to [employer] to have your doctor print off.

## 2020-12-15 NOTE — PROGRESS NOTES
Le Ruelas is a 69 y.o. female who presents for follow-up. Reports problems with night terrors since 30's. Awakens with panic. She denies insomnia. Reports it is occurring more often, no reason. Seen at a sleep clinic 25 years ago, negative work up. Possible trazodone and tried [place]. Will get left hand and sometimes entire arm numbness at night. Has occurred when sitting up and getting sleepy. Right handed. No neuro symptoms. Reports normal BM. She underwent a prior colonoscopy with Dr. Gabby Red was negative in 2017. Sees Dr. Wil Naik. No DUB. Normal urination. Seen at a wellness center for evaluation for bioidentical hormone treatment. Stopped HRT     Mammogram normal, 2020.     Prior DEXA, osteopenia and osteoporosis, may 2019. Vitamin D normal in , on 5k daily.       Up to date on eye, prior bilateral cataract surgery, 5 years ago. Due for dental.         Past Medical History:   Diagnosis Date    Hepatitis A        Family History   Problem Relation Age of Onset    Cancer Mother         cervical    Hypertension Mother    Johanne Scott Bladder Disease Mother     Heart Disease Father     No Known Problems Sister     No Known Problems Brother        Social History     Socioeconomic History    Marital status:      Spouse name: Not on file    Number of children: Not on file    Years of education: Not on file    Highest education level: Not on file   Occupational History    Not on file   Social Needs    Financial resource strain: Not on file    Food insecurity     Worry: Not on file     Inability: Not on file    Transportation needs     Medical: Not on file     Non-medical: Not on file   Tobacco Use    Smoking status: Former Smoker     Packs/day: 0.25     Years: 2.00     Pack years: 0.50     Quit date: 1970     Years since quittin.1    Smokeless tobacco: Never Used   Substance and Sexual Activity    Alcohol use:  Yes    Drug use: No    Sexual activity: Not Currently     Partners: Male   Lifestyle    Physical activity     Days per week: Not on file     Minutes per session: Not on file    Stress: Not on file   Relationships    Social connections     Talks on phone: Not on file     Gets together: Not on file     Attends Gnosticist service: Not on file     Active member of club or organization: Not on file     Attends meetings of clubs or organizations: Not on file     Relationship status: Not on file    Intimate partner violence     Fear of current or ex partner: Not on file     Emotionally abused: Not on file     Physically abused: Not on file     Forced sexual activity: Not on file   Other Topics Concern    Not on file   Social History Narrative    Not on file       Current Outpatient Medications on File Prior to Visit   Medication Sig Dispense Refill    cholecalciferol (VITAMIN D3) 1,000 unit tablet Take 5,000 Units by mouth daily.  rabies vaccine, PCEC (RABAVERT) 2.5 unit injection Day 0 -  6/30/2019 - Given in ED  (Provider will fill in date):       Day 3 -   7/03/2019      Day 7-    07/07/2019     Day 14-  07/14/2019   Indication: Rabies Exposure  Prescription date: 6/30/2019 1 mL 2     No current facility-administered medications on file prior to visit. Review of Systems  Pertinent items are noted in HPI. Objective:     Visit Vitals  /60 (BP 1 Location: Left arm, BP Patient Position: Sitting)   Pulse (!) 56   Temp 97.6 °F (36.4 °C) (Temporal)   Resp 16   Ht 5' 4\" (1.626 m)   Wt 138 lb (62.6 kg)   SpO2 99%   BMI 23.69 kg/m²     Gen: well appearing female  HEENT:   PERRL,normal conjunctiva. External ear and canals normal, TMs no opacification or erythema,  OP no erythema, no exudates, MMM  Neck:  Supple. Thyroid normal size, nontender, without nodules. No masses or LAD  Resp:  No wheezing, no rhonchi, no rales. CV:  RRR, normal S1S2, no murmur. GI: soft, nontender, without masses. No hepatosplenomegaly.   Extrem:  +2 pulses, no edema, warm distally      Assessment/Plan:       ICD-10-CM ICD-9-CM    1. Disrupted sleep-wake cycle  G47.20 307.45 diazePAM (Valium) 5 mg tablet    F51.8  prazosin (MINIPRESS) 1 mg capsule   2. Screening for depression  Z13.31 V79.0 DEPRESSION SCREEN ANNUAL   3. Medicare annual wellness visit, subsequent  Z00.00 V70.0    4. Screening for diabetes mellitus  V33.6 D89.5 METABOLIC PANEL, COMPREHENSIVE   5. Screening for hyperlipidemia  Z13.220 V77.91 LIPID PANEL   6. Chronic diarrhea  K52.9 787.91    7. Vitamin D deficiency  E55.9 268.9    8. Age-related osteoporosis without current pathological fracture  K16.1 002.34 METABOLIC PANEL, COMPREHENSIVE      CBC W/O DIFF   9. Need for hepatitis C screening test  Z11.59 V73.89 HEPATITIS C AB   10.  Encounter for immunization  Z23 V03.89 varicella-zoster recombinant, PF, (SHINGRIX) 50 mcg/0.5 mL susr injection     Follow-up and Dispositions    · Return for follow up pending labs and annual.  .         Joselyn Blackwood MD

## 2020-12-15 NOTE — PROGRESS NOTES
This is the Subsequent Medicare Annual Wellness Exam, performed 12 months or more after the Initial AWV or the last Subsequent AWV    I have reviewed the patient's medical history in detail and updated the computerized patient record. Depression Risk Factor Screening:     3 most recent PHQ Screens 9/4/2019   Little interest or pleasure in doing things Not at all   Feeling down, depressed, irritable, or hopeless Not at all   Total Score PHQ 2 0       Alcohol Risk Screen   Do you average more than 1 drink per night or more than 7 drinks a week:  No    On any one occasion in the past three months have you have had more than 3 drinks containing alcohol:  No        Functional Ability and Level of Safety:   Hearing: Hearing is good. Activities of Daily Living: The home contains: no safety equipment. Patient does total self care     Ambulation: with no difficulty     Fall Risk:  Fall Risk Assessment, last 12 mths 9/4/2019   Able to walk? Yes   Fall in past 12 months? No     Abuse Screen:  Patient is not abused       Cognitive Screening   Has your family/caregiver stated any concerns about your memory: no     Cognitive Screening: Normal - Verbal Fluency Test    Assessment/Plan   Education and counseling provided:  Are appropriate based on today's review and evaluation    Diagnoses and all orders for this visit:    1. Screening for depression  -     DEPRESSION SCREEN ANNUAL    2.  Medicare annual wellness visit, subsequent        Health Maintenance Due     Health Maintenance Due   Topic Date Due    Hepatitis C Screening  1950    Shingrix Vaccine Age 50> (1 of 2) 10/28/1999    Medicare Yearly Exam  05/20/2020    Flu Vaccine (1) 09/01/2020    Pneumococcal 65+ years (2 of 2 - PPSV23) 09/26/2020       Patient Care Team   Patient Care Team:  Jane Levine MD as PCP - General (Internal Medicine)  Jane Lveine MD as [facility]CP [facility]  Fadi Canales MD as Gynecologist (Obstetrics & Gynecology)    History   There is no problem list on file for this patient. Past Medical History:   Diagnosis Date    Hepatitis A       Past Surgical History:   Procedure Laterality Date    COLONOSCOPY N/A 2016    COLONOSCOPY performed by Apple Bess MD at [address] [facility]      ovarian cyst, as teen, one ovary removed ? right. Current Outpatient Medications   Medication Sig Dispense Refill    rabies vaccine, PCEC (RABAVERT) 2.5 unit injection Day 0 -  2019 - Given in ED  (Provider will fill in date):       Day 3 -   2019      Day 7-    2019     Day 14-  2019   Indication: Rabies Exposure  Prescription date: 2019 1 mL 2    cholecalciferol (VITAMIN D3) 1,000 unit tablet Take 1,000 Units by mouth daily. Allergies   Allergen Reactions    Penicillin G Other (comments)     Told by parents, not sure    Shellfish Derived Angioedema       Family History   Problem Relation Age of Onset    Cancer Mother         cervical    Hypertension Mother    [place] Bladder Disease Mother     Heart Disease Father     No Known Problems Sister     No Known Problems Brother      Social History     Tobacco Use    Smoking status: Former Smoker     Last attempt to quit: 1970     Years since quittin.1    Smokeless tobacco: Never Used   Substance Use Topics    Alcohol use:  Yes

## 2020-12-16 ENCOUNTER — OFFICE VISIT (OUTPATIENT)
Dept: INTERNAL MEDICINE CLINIC | Age: 70
End: 2020-12-16
Payer: MEDICARE

## 2020-12-16 VITALS
OXYGEN SATURATION: 99 % | BODY MASS INDEX: 23.56 KG/M2 | SYSTOLIC BLOOD PRESSURE: 110 MMHG | TEMPERATURE: 97.6 F | RESPIRATION RATE: 16 BRPM | DIASTOLIC BLOOD PRESSURE: 60 MMHG | WEIGHT: 138 LBS | HEART RATE: 56 BPM | HEIGHT: 64 IN

## 2020-12-16 DIAGNOSIS — Z11.59 NEED FOR HEPATITIS C SCREENING TEST: ICD-10-CM

## 2020-12-16 DIAGNOSIS — Z13.220 SCREENING FOR HYPERLIPIDEMIA: ICD-10-CM

## 2020-12-16 DIAGNOSIS — E55.9 VITAMIN D DEFICIENCY: ICD-10-CM

## 2020-12-16 DIAGNOSIS — G47.20 DISRUPTED SLEEP-WAKE CYCLE: Primary | ICD-10-CM

## 2020-12-16 DIAGNOSIS — F51.8 DISRUPTED SLEEP-WAKE CYCLE: Primary | ICD-10-CM

## 2020-12-16 DIAGNOSIS — Z00.00 MEDICARE ANNUAL WELLNESS VISIT, SUBSEQUENT: ICD-10-CM

## 2020-12-16 DIAGNOSIS — Z23 ENCOUNTER FOR IMMUNIZATION: ICD-10-CM

## 2020-12-16 DIAGNOSIS — Z13.1 SCREENING FOR DIABETES MELLITUS: ICD-10-CM

## 2020-12-16 DIAGNOSIS — M81.0 AGE-RELATED OSTEOPOROSIS WITHOUT CURRENT PATHOLOGICAL FRACTURE: ICD-10-CM

## 2020-12-16 DIAGNOSIS — K52.9 CHRONIC DIARRHEA: ICD-10-CM

## 2020-12-16 DIAGNOSIS — Z13.31 SCREENING FOR DEPRESSION: ICD-10-CM

## 2020-12-16 PROCEDURE — G8420 CALC BMI NORM PARAMETERS: HCPCS | Performed by: FAMILY MEDICINE

## 2020-12-16 PROCEDURE — 99214 OFFICE O/P EST MOD 30 MIN: CPT | Performed by: FAMILY MEDICINE

## 2020-12-16 PROCEDURE — G0439 PPPS, SUBSEQ VISIT: HCPCS | Performed by: FAMILY MEDICINE

## 2020-12-16 PROCEDURE — G8510 SCR DEP NEG, NO PLAN REQD: HCPCS | Performed by: FAMILY MEDICINE

## 2020-12-16 PROCEDURE — G8427 DOCREV CUR MEDS BY ELIG CLIN: HCPCS | Performed by: FAMILY MEDICINE

## 2020-12-16 PROCEDURE — 1101F PT FALLS ASSESS-DOCD LE1/YR: CPT | Performed by: FAMILY MEDICINE

## 2020-12-16 PROCEDURE — 1090F PRES/ABSN URINE INCON ASSESS: CPT | Performed by: FAMILY MEDICINE

## 2020-12-16 PROCEDURE — 3017F COLORECTAL CA SCREEN DOC REV: CPT | Performed by: FAMILY MEDICINE

## 2020-12-16 PROCEDURE — G8536 NO DOC ELDER MAL SCRN: HCPCS | Performed by: FAMILY MEDICINE

## 2020-12-16 PROCEDURE — G9899 SCRN MAM PERF RSLTS DOC: HCPCS | Performed by: FAMILY MEDICINE

## 2020-12-16 RX ORDER — DIAZEPAM 5 MG/1
5 TABLET ORAL
Qty: 15 TAB | Refills: 0 | Status: SHIPPED | OUTPATIENT
Start: 2020-12-16 | End: 2021-11-12 | Stop reason: SDUPTHER

## 2020-12-16 RX ORDER — PRAZOSIN HYDROCHLORIDE 1 MG/1
1 CAPSULE ORAL
Qty: 30 CAP | Refills: 1 | Status: SHIPPED | OUTPATIENT
Start: 2020-12-16 | End: 2022-02-14

## 2020-12-16 NOTE — PROGRESS NOTES
Reviewed record in preparation for visit and have obtained necessary documentation. Identified pt with two pt identifiers(name and ). Chief Complaint   Patient presents with    Annual Wellness Visit    Sleep Problem    Medication Evaluation       Health Maintenance Due   Topic Date Due    Hepatitis C Test  1950    Shingles Vaccine (1 of 2) 10/28/1999    Pneumococcal Vaccine (2 of 2 - PPSV23) 2020       Ms. Jr Ndiaye has a reminder for a \"due or due soon\" health maintenance. I have asked that she discuss this further with her primary care provider for follow-up on this health maintenance. Coordination of Care Questionnaire:  :     1) Have you been to an emergency room, urgent care clinic since your last visit? no   Hospitalized since your last visit? no          2) Have you seen or consulted any other health care providers outside of [address] [place] [address] since your last visit? no  (Include any pap smears or colon screenings in this section.)    3) In the event something were to happen to you and you were unable to speak on your behalf, do you have an Advance Directive/ Living Will in place stating your wishes? NO    Do you have an Advance Directive on file? no    4) Are you interested in receiving information on Advance Directives? YES    Patient is accompanied by self I have received verbal consent from Janeth Herrera to discuss any/all medical information while they are present in the room.

## 2020-12-19 LAB
ALBUMIN SERPL-MCNC: 4.4 G/DL (ref 3.7–4.7)
ALBUMIN/GLOB SERPL: 1.9 {RATIO} (ref 1.2–2.2)
ALP SERPL-CCNC: 30 IU/L (ref 39–117)
ALT SERPL-CCNC: 21 IU/L (ref 0–32)
AST SERPL-CCNC: 21 IU/L (ref 0–40)
BILIRUB SERPL-MCNC: 0.2 MG/DL (ref 0–1.2)
BUN SERPL-MCNC: 22 MG/DL (ref 8–27)
BUN/CREAT SERPL: 20 (ref 12–28)
CALCIUM SERPL-MCNC: 9.5 MG/DL (ref 8.7–10.3)
CHLORIDE SERPL-SCNC: 102 MMOL/L (ref 96–106)
CHOLEST SERPL-MCNC: 184 MG/DL (ref 100–199)
CO2 SERPL-SCNC: 24 MMOL/L (ref 20–29)
CREAT SERPL-MCNC: 1.08 MG/DL (ref 0.57–1)
ERYTHROCYTE [DISTWIDTH] IN BLOOD BY AUTOMATED COUNT: 13.2 % (ref 11.7–15.4)
GLOBULIN SER CALC-MCNC: 2.3 G/DL (ref 1.5–4.5)
GLUCOSE SERPL-MCNC: 112 MG/DL (ref 65–99)
HCT VFR BLD AUTO: 38.7 % (ref 34–46.6)
HCV AB S/CO SERPL IA: 0.2 S/CO RATIO (ref 0–0.9)
HDLC SERPL-MCNC: 73 MG/DL
HGB BLD-MCNC: 13.1 G/DL (ref 11.1–15.9)
LDLC SERPL CALC-MCNC: 96 MG/DL (ref 0–99)
MCH RBC QN AUTO: 29 PG (ref 26.6–33)
MCHC RBC AUTO-ENTMCNC: 33.9 G/DL (ref 31.5–35.7)
MCV RBC AUTO: 86 FL (ref 79–97)
PLATELET # BLD AUTO: 288 X10E3/UL (ref 150–450)
POTASSIUM SERPL-SCNC: 4.4 MMOL/L (ref 3.5–5.2)
PROT SERPL-MCNC: 6.7 G/DL (ref 6–8.5)
RBC # BLD AUTO: 4.52 X10E6/UL (ref 3.77–5.28)
SODIUM SERPL-SCNC: 139 MMOL/L (ref 134–144)
TRIGL SERPL-MCNC: 82 MG/DL (ref 0–149)
VLDLC SERPL CALC-MCNC: 15 MG/DL (ref 5–40)
WBC # BLD AUTO: 6.1 X10E3/UL (ref 3.4–10.8)

## 2020-12-22 ENCOUNTER — PATIENT MESSAGE (OUTPATIENT)
Dept: INTERNAL MEDICINE CLINIC | Age: 70
End: 2020-12-22

## 2021-10-25 ENCOUNTER — TRANSCRIBE ORDER (OUTPATIENT)
Dept: SCHEDULING | Age: 71
End: 2021-10-25

## 2021-10-25 DIAGNOSIS — Z12.31 SCREENING MAMMOGRAM FOR HIGH-RISK PATIENT: Primary | ICD-10-CM

## 2021-10-26 ENCOUNTER — HOSPITAL ENCOUNTER (OUTPATIENT)
Dept: MAMMOGRAPHY | Age: 71
Discharge: HOME | End: 2021-10-26
Attending: FAMILY MEDICINE
Payer: MEDICARE

## 2021-10-26 DIAGNOSIS — Z12.31 SCREENING MAMMOGRAM FOR HIGH-RISK PATIENT: ICD-10-CM

## 2021-10-26 PROCEDURE — 77067 SCR MAMMO BI INCL CAD: CPT

## 2021-11-12 ENCOUNTER — VIRTUAL VISIT (OUTPATIENT)
Dept: INTERNAL MEDICINE CLINIC | Age: 71
End: 2021-11-12
Payer: MEDICARE

## 2021-11-12 DIAGNOSIS — M25.50 ARTHRALGIA, UNSPECIFIED JOINT: ICD-10-CM

## 2021-11-12 DIAGNOSIS — G47.20 DISRUPTED SLEEP-WAKE CYCLE: ICD-10-CM

## 2021-11-12 DIAGNOSIS — F51.01 PRIMARY INSOMNIA: ICD-10-CM

## 2021-11-12 DIAGNOSIS — F51.8 DISRUPTED SLEEP-WAKE CYCLE: ICD-10-CM

## 2021-11-12 DIAGNOSIS — E78.5 HYPERLIPIDEMIA, UNSPECIFIED HYPERLIPIDEMIA TYPE: ICD-10-CM

## 2021-11-12 DIAGNOSIS — M79.10 MYALGIA: Primary | ICD-10-CM

## 2021-11-12 PROCEDURE — 99442 PR PHYS/QHP TELEPHONE EVALUATION 11-20 MIN: CPT | Performed by: FAMILY MEDICINE

## 2021-11-12 RX ORDER — CHOLECALCIFEROL (VITAMIN D3) 50 MCG
CAPSULE ORAL
COMMUNITY
End: 2022-02-14

## 2021-11-12 RX ORDER — LANOLIN ALCOHOL/MO/W.PET/CERES
1000 CREAM (GRAM) TOPICAL DAILY
COMMUNITY

## 2021-11-12 RX ORDER — ASCORBIC ACID 500 MG
3000 TABLET ORAL
COMMUNITY

## 2021-11-12 RX ORDER — DIAZEPAM 5 MG/1
5 TABLET ORAL
Qty: 15 TABLET | Refills: 0 | Status: SHIPPED | OUTPATIENT
Start: 2021-11-12 | End: 2022-02-14 | Stop reason: SDUPTHER

## 2021-11-12 NOTE — PROGRESS NOTES
Chief Complaint   Patient presents with    Pain (Chronic)    Insomnia    Heartburn     3 most recent PHQ Screens 11/12/2021   Little interest or pleasure in doing things Not at all   Feeling down, depressed, irritable, or hopeless Not at all   Total Score PHQ 2 0     1. Have you been to the ER, urgent care clinic since your last visit? Hospitalized since your last visit?no    2. Have you seen or consulted any other health care providers outside of the [address] [place] [address] since your last visit? Include any pap smears or colon screening.  ortho

## 2021-11-12 NOTE — PROGRESS NOTES
Angie Haas is a 66 y.o. female who presents for follow up. Reports dull pain all over body, comes and goes. Pain in neck, shoulders, lower back, knees, legs, arms. Will get muscle cramping at times. Has ongoing sleep issues. Takes valium for travel insomnia. This is an established visit conducted via telemedicine, by phone. The patient has been instructed that this meets HIPAA criteria and acknowledges and agrees to this method of visitation. Pursuant to the emergency declaration under the [facility] [place] [address] [facility] authority and the [employer] Act, this Virtual Visit was conducted, with patient's consent, to reduce the patient's risk of exposure to COVID-19 and provide continuity of care for an established patient. Services were provided by phone to substitute for in-person clinic visit. Past Medical History:   Diagnosis Date    Hepatitis A        Family History   Problem Relation Age of Onset    Cancer Mother         cervical    Hypertension Mother    Shantel Speck Bladder Disease Mother     Heart Disease Father     No Known Problems Sister     No Known Problems Brother        Social History     Socioeconomic History    Marital status:      Spouse name: Not on file    Number of children: Not on file    Years of education: Not on file    Highest education level: Not on file   Occupational History    Not on file   Tobacco Use    Smoking status: Former Smoker     Packs/day: 0.25     Years: 2.00     Pack years: 0.50     Quit date: 1970     Years since quittin.0    Smokeless tobacco: Never Used   Vaping Use    Vaping Use: Never used   Substance and Sexual Activity    Alcohol use:  Yes    Drug use: No    Sexual activity: Not Currently     Partners: Male   Other Topics Concern    Not on file   Social History Narrative    Not on file     Social Determinants of Health Financial Resource Strain:     Difficulty of Paying Living Expenses: Not on file   Food Insecurity:     Worried About Running Out of Food in the Last Year: Not on file    Asha of Food in the Last Year: Not on file   Transportation Needs:     Lack of Transportation (Medical): Not on file    Lack of Transportation (Non-Medical): Not on file   Physical Activity:     Days of Exercise per Week: Not on file    Minutes of Exercise per Session: Not on file   Stress:     Feeling of Stress : Not on file   Social Connections:     Frequency of Communication with Friends and Family: Not on file    Frequency of Social Gatherings with Friends and Family: Not on file    Attends Zoroastrian Services: Not on file    Active Member of [address] [place] [address] [employer] or Organizations: Not on file    Attends Club or Organization Meetings: Not on file    Marital Status: Not on file   Intimate Partner Violence:     Fear of Current or Ex-Partner: Not on file    Emotionally Abused: Not on file    Physically Abused: Not on file    Sexually Abused: Not on file   Housing Stability:     Unable to Pay for Housing in the Last Year: Not on file    Number of [place] in the Last Year: Not on file    Unstable Housing in the Last Year: Not on file       Current Outpatient Medications on File Prior to Visit   Medication Sig Dispense Refill    COLLAGEN by Does Not Apply route.  tumeric-ging-olive-oreg-capryl 100 mg-150 mg- 50 mg-150 mg cap Take  by mouth.  zinc sulfate (ZINC-15 PO) Take  by mouth.  ascorbic acid, vitamin C, (Vitamin C) 500 mg tablet Take 3,000 mg by mouth.  cyanocobalamin 1,000 mcg tablet Take 1,000 mcg by mouth daily.  PAULINE ROOT, BULK, by Does Not Apply route.  B.infantis-B.ani-B.long-B.bifi (Probiotic 4X) 10-15 mg TbEC Take  by mouth.  cholecalciferol (VITAMIN D3) 1,000 unit tablet Take 5,000 Units by mouth daily.  potassium 99 mg tablet Take 99 mg by mouth daily.  3 =times a week      prazosin (MINIPRESS) 1 mg capsule Take 1 Cap by mouth nightly. (Patient not taking: Reported on 11/12/2021) 30 Cap 1    [DISCONTINUED] diazePAM (Valium) 5 mg tablet Take 1 Tab by mouth nightly as needed for Anxiety or Sleep. Max Daily Amount: 5 mg. (Patient not taking: Reported on 11/12/2021) 15 Tab 0    rabies vaccine, PCEC (RABAVERT) 2.5 unit injection Day 0 -  6/30/2019 - Given in ED  (Provider will fill in date):       Day 3 -   7/03/2019      Day 7-    07/07/2019     Day 14-  07/14/2019   Indication: Rabies Exposure  Prescription date: 6/30/2019 (Patient not taking: Reported on 11/12/2021) 1 mL 2     No current facility-administered medications on file prior to visit. Review of Systems  Pertinent items are noted in HPI. Objective:     Gen: healthy sounding female  HEENT: no audible congestion, patient does not see oral erythema and sees MMM  Neck: patient does not feel enlarged or tender LAD or masses  Resp: normal respiratory effort, no audible wheezing. CV: patient does not feel palpitations or heart irregularity  Abd: patient does not feel abdominal tenderness or mass, patient does not notice distension  Extrem: patient does not see swelling in ankles or joints. Neuro: Alert and oriented, able to answer questions without difficulty, patient reports able to move all extremities and walk normally        Assessment/Plan:       ICD-10-CM ICD-9-CM    1. Myalgia  R25.85 818.6 METABOLIC PANEL, COMPREHENSIVE      CBC W/O DIFF      TSH 3RD GENERATION   2. Hyperlipidemia, unspecified hyperlipidemia type  E58.8 782.2 METABOLIC PANEL, COMPREHENSIVE      LIPID PANEL   3. Arthralgia, unspecified joint  O32.44 488.95 METABOLIC PANEL, COMPREHENSIVE      CBC W/O DIFF      TSH 3RD GENERATION   4. Disrupted sleep-wake cycle  G47.20 307.45 diazePAM (Valium) 5 mg tablet    F51.8     5. Primary insomnia  F51.01 307.42    cheyanne's for leg cramps, increase water intake. Schedule appt for medicare wellness.       This was a telemedicine visit by phone, duration 12 minutes. Franklin Dickinson MD    Follow-up and Dispositions    · Return for medicare wellness December 2021. Emanuel Fallon

## 2021-11-15 ENCOUNTER — APPOINTMENT (OUTPATIENT)
Dept: INTERNAL MEDICINE CLINIC | Age: 71
End: 2021-11-15

## 2021-11-15 DIAGNOSIS — E78.5 HYPERLIPIDEMIA, UNSPECIFIED HYPERLIPIDEMIA TYPE: ICD-10-CM

## 2021-11-15 DIAGNOSIS — M25.50 ARTHRALGIA, UNSPECIFIED JOINT: ICD-10-CM

## 2021-11-15 DIAGNOSIS — M79.10 MYALGIA: ICD-10-CM

## 2021-11-15 LAB
ALBUMIN SERPL-MCNC: 3.9 G/DL (ref 3.5–5)
ALBUMIN/GLOB SERPL: 1.3 {RATIO} (ref 1.1–2.2)
ALP SERPL-CCNC: 47 U/L (ref 45–117)
ALT SERPL-CCNC: 24 U/L (ref 12–78)
ANION GAP SERPL CALC-SCNC: 5 MMOL/L (ref 5–15)
AST SERPL-CCNC: 19 U/L (ref 15–37)
BILIRUB SERPL-MCNC: 0.3 MG/DL (ref 0.2–1)
BUN SERPL-MCNC: 15 MG/DL (ref 6–20)
BUN/CREAT SERPL: 14 (ref 12–20)
CALCIUM SERPL-MCNC: 9.5 MG/DL (ref 8.5–10.1)
CHLORIDE SERPL-SCNC: 107 MMOL/L (ref 97–108)
CHOLEST SERPL-MCNC: 206 MG/DL
CO2 SERPL-SCNC: 27 MMOL/L (ref 21–32)
CREAT SERPL-MCNC: 1.04 MG/DL (ref 0.55–1.02)
ERYTHROCYTE [DISTWIDTH] IN BLOOD BY AUTOMATED COUNT: 14.5 % (ref 11.5–14.5)
GLOBULIN SER CALC-MCNC: 3 G/DL (ref 2–4)
GLUCOSE SERPL-MCNC: 107 MG/DL (ref 65–100)
HCT VFR BLD AUTO: 40.4 % (ref 35–47)
HDLC SERPL-MCNC: 88 MG/DL
HDLC SERPL: 2.3 {RATIO} (ref 0–5)
HGB BLD-MCNC: 12.5 G/DL (ref 11.5–16)
LDLC SERPL CALC-MCNC: 102 MG/DL (ref 0–100)
MCH RBC QN AUTO: 28.2 PG (ref 26–34)
MCHC RBC AUTO-ENTMCNC: 30.9 G/DL (ref 30–36.5)
MCV RBC AUTO: 91 FL (ref 80–99)
NRBC # BLD: 0 K/UL (ref 0–0.01)
NRBC BLD-RTO: 0 PER 100 WBC
PLATELET # BLD AUTO: 305 K/UL (ref 150–400)
PMV BLD AUTO: 9.3 FL (ref 8.9–12.9)
POTASSIUM SERPL-SCNC: 4.5 MMOL/L (ref 3.5–5.1)
PROT SERPL-MCNC: 6.9 G/DL (ref 6.4–8.2)
RBC # BLD AUTO: 4.44 M/UL (ref 3.8–5.2)
SODIUM SERPL-SCNC: 139 MMOL/L (ref 136–145)
TRIGL SERPL-MCNC: 80 MG/DL (ref ?–150)
TSH SERPL DL<=0.05 MIU/L-ACNC: 5.8 UIU/ML (ref 0.36–3.74)
VLDLC SERPL CALC-MCNC: 16 MG/DL
WBC # BLD AUTO: 5.5 K/UL (ref 3.6–11)

## 2021-11-17 ENCOUNTER — TELEPHONE (OUTPATIENT)
Dept: INTERNAL MEDICINE CLINIC | Age: 71
End: 2021-11-17

## 2021-11-17 ENCOUNTER — PATIENT MESSAGE (OUTPATIENT)
Dept: INTERNAL MEDICINE CLINIC | Age: 71
End: 2021-11-17

## 2021-11-17 DIAGNOSIS — E03.9 ACQUIRED HYPOTHYROIDISM: Primary | ICD-10-CM

## 2021-11-17 RX ORDER — LEVOTHYROXINE SODIUM 50 UG/1
50 TABLET ORAL
Qty: 30 TABLET | Refills: 2 | Status: SHIPPED | OUTPATIENT
Start: 2021-11-17 | End: 2022-02-12

## 2021-12-10 ENCOUNTER — TELEPHONE (OUTPATIENT)
Dept: INTERNAL MEDICINE CLINIC | Age: 71
End: 2021-12-10

## 2021-12-10 NOTE — TELEPHONE ENCOUNTER
----- Message from Sujit Gresham sent at 12/9/2021  4:25 PM EST -----  Subject: Referral Request    QUESTIONS   Reason for referral request? Tested positive for Covid Sunday. Would like   order for Monoclonal Antibody treatment. Has the physician seen you for this condition before? No   Preferred Specialist (if applicable)? Do you already have an appointment scheduled? No  Additional Information for Provider?   ---------------------------------------------------------------------------  --------------  CALL BACK INFO  What is the best way for the office to contact you? OK to leave message on   voicemail  Preferred Call Back Phone Number?  [phone]

## 2022-01-19 LAB
T4 FREE SERPL-MCNC: 1.42 NG/DL (ref 0.82–1.77)
TSH SERPL DL<=0.005 MIU/L-ACNC: 1.44 UIU/ML (ref 0.45–4.5)

## 2022-01-23 ENCOUNTER — PATIENT MESSAGE (OUTPATIENT)
Dept: INTERNAL MEDICINE CLINIC | Age: 72
End: 2022-01-23

## 2022-02-12 RX ORDER — LEVOTHYROXINE SODIUM 50 UG/1
TABLET ORAL
Qty: 30 TABLET | Refills: 2 | Status: SHIPPED | OUTPATIENT
Start: 2022-02-12 | End: 2022-04-09

## 2022-02-12 NOTE — PATIENT INSTRUCTIONS
Medicare Wellness Visit, Female     The best way to live healthy is to have a lifestyle where you eat a well-balanced diet, exercise regularly, limit alcohol use, and quit all forms of tobacco/nicotine, if applicable. Regular preventive services are another way to keep healthy. Preventive services (vaccines, screening tests, monitoring & exams) can help personalize your care plan, which helps you manage your own care. Screening tests can find health problems at the earliest stages, when they are easiest to treat. [facility] follows the current, evidence-based guidelines published by the [facility] Adalid Mcginnis ([facility]) when recommending preventive services for our patients. Because we follow these guidelines, sometimes recommendations change over time as research supports it. (For example, mammograms used to be recommended annually. Even though Medicare will still pay for an annual mammogram, the newer guidelines recommend a mammogram every two years for women of average risk). Of course, you and your doctor may decide to screen more often for some diseases, based on your risk and your co-morbidities (chronic disease you are already diagnosed with). Preventive services for you include:  - Medicare offers their members a free annual wellness visit, which is time for you and your primary care provider to discuss and plan for your preventive service needs. Take advantage of this benefit every year!  -All adults over the age of 72 should receive the recommended pneumonia vaccines. Current USPSTF guidelines recommend a series of two vaccines for the best pneumonia protection.   -All adults should have a flu vaccine yearly and a tetanus vaccine every 10 years.   -All adults age 48 and older should receive the shingles vaccines (series of two vaccines).       -All adults age 38-68 who are overweight should have a diabetes screening test once every three years.   -All adults born between [birth year] and 1966 should be screened once for Hepatitis C.  -Other screening tests and preventive services for persons with diabetes include: an eye exam to screen for diabetic retinopathy, a kidney function test, a foot exam, and stricter control over your cholesterol.   -Cardiovascular screening for adults with routine risk involves an electrocardiogram (ECG) at intervals determined by your doctor.   -Colorectal cancer screenings should be done for adults age 54-65 with no increased risk factors for colorectal cancer. There are a number of acceptable methods of screening for this type of cancer. Each test has its own benefits and drawbacks. Discuss with your doctor what is most appropriate for you during your annual wellness visit. The different tests include: colonoscopy (considered the best screening method), a fecal occult blood test, a fecal DNA test, and sigmoidoscopy.    -A bone mass density test is recommended when a woman turns 65 to screen for osteoporosis. This test is only recommended one time, as a screening. Some providers will use this same test as a disease monitoring tool if you already have osteoporosis. -Breast cancer screenings are recommended every other year for women of normal risk, age 54-69.  -Cervical cancer screenings for women over age 72 are only recommended with certain risk factors.        Sleep hygiene: Basic rules for a good night's sleep  Sleep only as much as you need to feel rested and then get out of bed   Keep a regular sleep schedule   Do not try to sleep unless you feel sleepy   Exercise regularly, preferably at least 4 to 5 hours before bedtime   Avoid caffeinated beverages after lunch   Avoid alcohol near bedtime: no \"night cap\"   Avoid smoking, especially in the evening   Do not go to bed hungry   Make the bedroom environment conducive to sleep   Avoid prolonged use of light-emitting screens before bedtime    Deal with your worries before bedtime     15 minutes of sun a day. Noise canceling earbuds. Hand Arthritis: Exercises  Introduction  Here are some examples of exercises for you to try. The exercises may be suggested for a condition or for rehabilitation. Start each exercise slowly. Ease off the exercises if you start to have pain. You will be told when to start these exercises and which ones will work best for you. How to do the exercises  Tendon glides    1. In this exercise, the steps follow one another to a make a continuous movement. 2. With your affected hand, point your fingers and thumb straight up. Your wrist should be relaxed, following the line of your fingers and thumb. 3. Curl your fingers so that the top two joints in them are bent, and your fingers wrap down. Your fingertips should touch or be near the base of your fingers. Your fingers will look like a hook. 4. Make a fist by bending your knuckles. Your thumb can gently rest against your index (pointing) finger. 5. Unwind your fingers slightly so that your fingertips can touch the base of your palm. Your thumb can rest against your index finger. 6. Move back to your starting position, with your fingers and thumb pointing up. 7. Repeat the series of motions 8 to 12 times. 8. Switch hands and repeat steps 1 through 6, even if only one hand is sore. Intrinsic flexion    1. Rest your affected hand on a table and bend the large joints where your fingers connect to your hand. Keep your thumb and the other joints in your fingers straight. 2. Slowly straighten your fingers. Your wrist should be relaxed, following the line of your fingers and thumb. 3. Move back to your starting position, with your hand bent. 4. Repeat 8 to 12 times. 5. Switch hands and repeat steps 1 through 4, even if only one hand is sore. Finger extension    1. Place your affected hand flat on a table. 2. Lift and then lower one finger at a time off the table. 3. Repeat 8 to 12 times.   4. Switch hands and repeat steps 1 through 3, even if only one hand is sore. MP extension    1. Place your good hand on a table, palm up. Put your affected hand on top of your good hand with your fingers wrapped around the thumb of your good hand like you are making a fist.  2. Slowly uncurl the joints of your affected hand where your fingers connect to your hand so that only the top two joints of your fingers are bent. Your fingers will look like a hook. 3. Move back to your starting position, with your fingers wrapped around your good thumb. 4. Repeat 8 to 12 times. 5. Switch hands and repeat steps 1 through 4, even if only one hand is sore. PIP extension (with MP extension)    1. Place your good hand on a table, palm up. Put your affected hand on top of your good hand, palm up. 2. Use the thumb and fingers of your good hand to grasp below the middle joint of one finger of your affected hand. 3. Straighten the last two joints of that finger. 4. Repeat 8 to 12 times. 5. Repeat steps 1 through 4 with each finger. 6. Switch hands and repeat steps 1 through 5, even if only one hand is sore. DIP flexion    1. With your good hand, grasp one finger of your affected hand. Your thumb will be on the top side of your finger just below the joint that is closest to your fingernail. 2. Slowly bend your affected finger only at the joint closest to your fingernail. 3. Repeat 8 to 12 times. 4. Repeat steps 1 through 3 with each finger. 5. Switch hands and repeat steps 1 through 4, even if only one hand is sore. Follow-up care is a key part of your treatment and safety. Be sure to make and go to all appointments, and call your doctor if you are having problems. It's also a good idea to know your test results and keep a list of the medicines you take. Where can you learn more? Go to http://www.gray.com/  Enter E040 in the search box to learn more about \"Hand Arthritis: Exercises. \"  Current as of: July 1, 2021               Content Version: 13.0  © 7442-7800 Healthwise, Incorporated. Care instructions adapted under license by [employer] (which disclaims liability or warranty for this information). If you have questions about a medical condition or this instruction, always ask your healthcare professional. [address] any warranty or liability for your use of this information. VOLTAREN GEL  2 GRAMS UP TO 4 TIMES A DAY AS NEEDED FOR ARTHRITIS PAIN    KNEE SLEEVES,  MAVA IS A GOOD BRAND.

## 2022-02-12 NOTE — PROGRESS NOTES
Stacey Kinney is a 66 y.o. female who presents for follow up.      Treated for hypothyroidism,  TSH at goal on 50mcg daily       Had Covid Dec 4. Felt dizzy, fatigued, loss of taste, cough for 2 weeks. No Covid vaccine.       Has ongoing sleep issues, night terrors. Reports arms will go numb, sleeps on sides. 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 Prior prazosin, ambien and trazodone. When not taking diazepam, she has trouble falling asleep, usually 15-20 minute sleep latency. Will awakens during the night, able to fall back to sleep. Has 1st MCP joint pain bilaterally, right knee pain. Reports normal BM. She underwent a prior colonoscopy with Dr. Sheridan Silva was negative in .     Sees Dr. Jr Gonzalez.  No DUB. Normal urination. Seen at a wellness center for evaluation for bioidentical hormone treatment. Stopped HRT      Mammogram normal, 2021.       Prior DEXA, osteopenia and osteoporosis, may 2019.  Vitamin D normal in , on 5k daily.       Up to date on eye, prior bilateral cataract surgery, 5 years ago. Deyvi Lepe for dental.            Past Medical History:   Diagnosis Date    Hepatitis A        Family History   Problem Relation Age of Onset    Cancer Mother         cervical    Hypertension Mother    [employer] Bladder Disease Mother     Heart Disease Father     No Known Problems Sister     No Known Problems Brother        Social History     Socioeconomic History    Marital status:      Spouse name: Not on file    Number of children: Not on file    Years of education: Not on file    Highest education level: Not on file   Occupational History    Not on file   Tobacco Use    Smoking status: Former Smoker     Packs/day: 0.25     Years: 2.00     Pack years: 0.50     Quit date: 1970     Years since quittin.2    Smokeless tobacco: Never Used   Vaping Use    Vaping Use: Never used   Substance and Sexual Activity    Alcohol use: Yes     Comment: seldom    Drug use: No    Sexual activity: Not Currently     Partners: Male   Other Topics Concern    Not on file   Social History Narrative    Not on file     Social Determinants of Health     Financial Resource Strain:     Difficulty of Paying Living Expenses: Not on file   Food Insecurity:     Worried About Running Out of Food in the Last Year: Not on file    Asha of Food in the Last Year: Not on file   Transportation Needs:     Lack of Transportation (Medical): Not on file    Lack of Transportation (Non-Medical): Not on file   Physical Activity:     Days of Exercise per Week: Not on file    Minutes of Exercise per Session: Not on file   Stress:     Feeling of Stress : Not on file   Social Connections:     Frequency of Communication with Friends and Family: Not on file    Frequency of Social Gatherings with Friends and Family: Not on file    Attends Congregational Services: Not on file    Active Member of [address] [place] [address] [employer] or Organizations: Not on file    Attends Club or Organization Meetings: Not on file    Marital Status: Not on file   Intimate Partner Violence:     Fear of Current or Ex-Partner: Not on file    Emotionally Abused: Not on file    Physically Abused: Not on file    Sexually Abused: Not on file   Housing Stability:     Unable to Pay for Housing in the Last Year: Not on file    Number of [place] in the Last Year: Not on file    Unstable Housing in the Last Year: Not on file       Current Outpatient Medications on File Prior to Visit   Medication Sig Dispense Refill    levothyroxine (SYNTHROID) 50 mcg tablet TAKE 1 TABLET BY MOUTH EVERY DAY BEFORE BREAKFAST 30 Tablet 2    zinc sulfate (ZINC-15 PO) Take  by mouth.  ascorbic acid, vitamin C, (Vitamin C) 500 mg tablet Take 3,000 mg by mouth.  cyanocobalamin 1,000 mcg tablet Take 1,000 mcg by mouth daily.  PAULINE ROOT, BULK, by Does Not Apply route.  potassium 99 mg tablet Take 99 mg by mouth daily.  3 =times a week      cholecalciferol (VITAMIN D3) 1,000 unit tablet Take 5,000 Units by mouth daily.  COLLAGEN by Does Not Apply route.  tumeric-ging-olive-oreg-capryl 100 mg-150 mg- 50 mg-150 mg cap Take  by mouth. (Patient not taking: Reported on 2/14/2022)      diazePAM (Valium) 5 mg tablet Take 1 Tablet by mouth nightly as needed for Anxiety or Sleep. Max Daily Amount: 5 mg. 15 Tablet 0    [DISCONTINUED] B.infantis-B.ani-B.long-B.bifi (Probiotic 4X) 10-15 mg TbEC Take  by mouth.  [DISCONTINUED] prazosin (MINIPRESS) 1 mg capsule Take 1 Cap by mouth nightly. (Patient not taking: Reported on 11/12/2021) 30 Cap 1    [DISCONTINUED] rabies vaccine, PCEC (RABAVERT) 2.5 unit injection Day 0 -  6/30/2019 - Given in ED  (Provider will fill in date):       Day 3 -   7/03/2019      Day 7-    07/07/2019     Day 14-  07/14/2019   Indication: Rabies Exposure  Prescription date: 6/30/2019 (Patient not taking: Reported on 11/12/2021) 1 mL 2     No current facility-administered medications on file prior to visit. Review of Systems  Pertinent items are noted in HPI. Objective:     Visit Vitals  /69 (BP 1 Location: Left arm, BP Patient Position: Sitting, BP Cuff Size: Adult)   Pulse 76   Temp 97.2 °F (36.2 °C) (Temporal)   Resp 16   Ht 5' 4\" (1.626 m)   Wt 140 lb (63.5 kg)   SpO2 96%   BMI 24.03 kg/m²     Gen: well appearing female  HEENT:   PERRL,normal conjunctiva. External ear and canals normal, TMs no opacification or erythema,  OP no erythema, no exudates, MMM  Neck:  Supple. Thyroid normal size, nontender, without nodules. No masses or LAD  Resp:  No wheezing, no rhonchi, no rales. CV:  RRR, normal S1S2, no murmur. GI: soft, nontender, without masses. No hepatosplenomegaly. Extrem:  +2 pulses, no edema, warm distally, OA hands. Assessment/Plan:       ICD-10-CM ICD-9-CM    1. Acquired hypothyroidism  E03.9 244.9    2. Hyperlipidemia, unspecified hyperlipidemia type  E78.5 272.4    3.  Screening for hyperlipidemia  Z13.220 V77.91    4. Screening for diabetes mellitus  Z13.1 V77.1    5. Medicare annual wellness visit, subsequent  Z00.00 V70.0        Follow-up and Dispositions    · Return in about 6 months (around 8/14/2022) for follow up on medication. Tiffany Medellin MD    This is the Subsequent Medicare Annual Wellness Exam, performed 12 months or more after the Initial AWV or the last Subsequent AWV    I have reviewed the patient's medical history in detail and updated the computerized patient record. Assessment/Plan   Education and counseling provided:  Are appropriate based on today's review and evaluation    1. Acquired hypothyroidism  2. Hyperlipidemia, unspecified hyperlipidemia type  3. Screening for hyperlipidemia  4. Screening for diabetes mellitus  5. Medicare annual wellness visit, subsequent       Depression Risk Factor Screening     3 most recent PHQ Screens 2/14/2022   Little interest or pleasure in doing things Not at all   Feeling down, depressed, irritable, or hopeless Not at all   Total Score PHQ 2 0       Alcohol & Drug Abuse Risk Screen    Do you average more than 1 drink per night or more than 7 drinks a week:  No    On any one occasion in the past three months have you have had more than 3 drinks containing alcohol:  No          Functional Ability and Level of Safety    Hearing: Hearing is good. Activities of Daily Living: The home contains: no safety equipment. Patient does total self care      Ambulation: with no difficulty     Fall Risk:  Fall Risk Assessment, last 12 mths 2/14/2022   Able to walk? Yes   Fall in past 12 months? 0   Do you feel unsteady?  0   Are you worried about falling 0   Number of falls in past 12 months -      Abuse Screen:  Patient is not abused       Cognitive Screening    Has your family/caregiver stated any concerns about your memory: no     Cognitive Screening: Normal - Verbal Fluency Test    Health Maintenance Due     Health Maintenance Due   Topic Date Due    COVID-19 Vaccine (1) Never done    Shingrix Vaccine Age 50> (1 of 2) Never done    Pneumococcal 65+ years (2 of 2 - PPSV23) 09/26/2020    Flu Vaccine (1) Never done       Patient Care Team   Patient Care Team:  Parish Michael MD as PCP - General (Internal Medicine)  Parish Michael MD as PCP - [address] [place] [address] Provider  Valeria Farnsworth MD as Gynecologist (Obstetrics & Gynecology)    History   There is no problem list on file for this patient. Past Medical History:   Diagnosis Date    Hepatitis A       Past Surgical History:   Procedure Laterality Date    COLONOSCOPY N/A 11/11/2016    COLONOSCOPY performed by Chinyere Rodriguez MD at [address] [facility]      ovarian cyst, as teen, one ovary removed ? right. Current Outpatient Medications   Medication Sig Dispense Refill    levothyroxine (SYNTHROID) 50 mcg tablet TAKE 1 TABLET BY MOUTH EVERY DAY BEFORE BREAKFAST 30 Tablet 2    zinc sulfate (ZINC-15 PO) Take  by mouth.  ascorbic acid, vitamin C, (Vitamin C) 500 mg tablet Take 3,000 mg by mouth.  cyanocobalamin 1,000 mcg tablet Take 1,000 mcg by mouth daily.  GINGER ROOT, BULK, by Does Not Apply route.  potassium 99 mg tablet Take 99 mg by mouth daily. 3 =times a week      cholecalciferol (VITAMIN D3) 1,000 unit tablet Take 5,000 Units by mouth daily.  COLLAGEN by Does Not Apply route.  tumeric-ging-olive-oreg-capryl 100 mg-150 mg- 50 mg-150 mg cap Take  by mouth. (Patient not taking: Reported on 2/14/2022)      diazePAM (Valium) 5 mg tablet Take 1 Tablet by mouth nightly as needed for Anxiety or Sleep.  Max Daily Amount: 5 mg. 15 Tablet 0     Allergies   Allergen Reactions    Penicillin G Other (comments)     Told by parents, not sure    Shellfish Derived Angioedema       Family History   Problem Relation Age of Onset    Cancer Mother         cervical    Hypertension Mother    [employer] Bladder Disease Mother     Heart Disease Father     No Known Problems Sister     No Known Problems Brother      Social History     Tobacco Use    Smoking status: Former Smoker     Packs/day: 0.25     Years: 2.00     Pack years: 0.50     Quit date: 1970     Years since quittin.2    Smokeless tobacco: Never Used   Substance Use Topics    Alcohol use: Yes     Comment: zhao Roque MD

## 2022-02-14 ENCOUNTER — OFFICE VISIT (OUTPATIENT)
Dept: INTERNAL MEDICINE CLINIC | Age: 72
End: 2022-02-14
Payer: MEDICARE

## 2022-02-14 VITALS
RESPIRATION RATE: 16 BRPM | TEMPERATURE: 97.2 F | WEIGHT: 140 LBS | BODY MASS INDEX: 23.9 KG/M2 | SYSTOLIC BLOOD PRESSURE: 112 MMHG | HEIGHT: 64 IN | OXYGEN SATURATION: 96 % | DIASTOLIC BLOOD PRESSURE: 69 MMHG | HEART RATE: 76 BPM

## 2022-02-14 DIAGNOSIS — E78.5 HYPERLIPIDEMIA, UNSPECIFIED HYPERLIPIDEMIA TYPE: ICD-10-CM

## 2022-02-14 DIAGNOSIS — Z13.1 SCREENING FOR DIABETES MELLITUS: ICD-10-CM

## 2022-02-14 DIAGNOSIS — G47.20 DISRUPTED SLEEP-WAKE CYCLE: ICD-10-CM

## 2022-02-14 DIAGNOSIS — Z00.00 MEDICARE ANNUAL WELLNESS VISIT, SUBSEQUENT: ICD-10-CM

## 2022-02-14 DIAGNOSIS — F51.8 DISRUPTED SLEEP-WAKE CYCLE: ICD-10-CM

## 2022-02-14 DIAGNOSIS — E03.9 ACQUIRED HYPOTHYROIDISM: Primary | ICD-10-CM

## 2022-02-14 DIAGNOSIS — Z13.220 SCREENING FOR HYPERLIPIDEMIA: ICD-10-CM

## 2022-02-14 PROCEDURE — G8399 PT W/DXA RESULTS DOCUMENT: HCPCS | Performed by: FAMILY MEDICINE

## 2022-02-14 PROCEDURE — 1101F PT FALLS ASSESS-DOCD LE1/YR: CPT | Performed by: FAMILY MEDICINE

## 2022-02-14 PROCEDURE — 99214 OFFICE O/P EST MOD 30 MIN: CPT | Performed by: FAMILY MEDICINE

## 2022-02-14 PROCEDURE — G8510 SCR DEP NEG, NO PLAN REQD: HCPCS | Performed by: FAMILY MEDICINE

## 2022-02-14 PROCEDURE — G9899 SCRN MAM PERF RSLTS DOC: HCPCS | Performed by: FAMILY MEDICINE

## 2022-02-14 PROCEDURE — 1090F PRES/ABSN URINE INCON ASSESS: CPT | Performed by: FAMILY MEDICINE

## 2022-02-14 PROCEDURE — G8427 DOCREV CUR MEDS BY ELIG CLIN: HCPCS | Performed by: FAMILY MEDICINE

## 2022-02-14 PROCEDURE — G0439 PPPS, SUBSEQ VISIT: HCPCS | Performed by: FAMILY MEDICINE

## 2022-02-14 PROCEDURE — G8536 NO DOC ELDER MAL SCRN: HCPCS | Performed by: FAMILY MEDICINE

## 2022-02-14 PROCEDURE — G8420 CALC BMI NORM PARAMETERS: HCPCS | Performed by: FAMILY MEDICINE

## 2022-02-14 PROCEDURE — 3017F COLORECTAL CA SCREEN DOC REV: CPT | Performed by: FAMILY MEDICINE

## 2022-02-14 RX ORDER — DIAZEPAM 5 MG/1
5 TABLET ORAL
Qty: 15 TABLET | Refills: 0 | Status: SHIPPED | OUTPATIENT
Start: 2022-02-14 | End: 2022-08-24 | Stop reason: SDUPTHER

## 2022-03-02 ENCOUNTER — TELEPHONE (OUTPATIENT)
Dept: INTERNAL MEDICINE CLINIC | Age: 72
End: 2022-03-02

## 2022-03-02 DIAGNOSIS — F51.01 PRIMARY INSOMNIA: ICD-10-CM

## 2022-03-02 DIAGNOSIS — Z91.89 AT RISK FOR SLEEP APNEA: Primary | ICD-10-CM

## 2022-03-02 NOTE — TELEPHONE ENCOUNTER
----- Message from Toyin Troy sent at 3/2/2022 10:49 AM EST -----  Subject: Referral Request    QUESTIONS   Reason for referral request? Aura Flynn would like to have Dr. Nik Gipson   refer her to have a sleep study done. She has to he about her trouble   sleeping and her arms are going numb, and it has gotten worse. Has the physician seen you for this condition before? Yes  Select a date? 2022-02-14  Select the Provider the patient wants to be referred to, if known (PCP or   Specialist)? SCHEDULE, PCP SLEEP STUDY   Preferred Specialist (if applicable)? Do you already have an appointment scheduled? No  Additional Information for Provider?   ---------------------------------------------------------------------------  --------------  CALL BACK INFO  What is the best way for the office to contact you? OK to leave message on   voicemail  Preferred Call Back Phone Number?  [phone]

## 2022-03-08 ENCOUNTER — OFFICE VISIT (OUTPATIENT)
Dept: SLEEP MEDICINE | Age: 72
End: 2022-03-08
Payer: MEDICARE

## 2022-03-08 VITALS
HEIGHT: 64 IN | DIASTOLIC BLOOD PRESSURE: 56 MMHG | RESPIRATION RATE: 20 BRPM | SYSTOLIC BLOOD PRESSURE: 114 MMHG | WEIGHT: 139.5 LBS | BODY MASS INDEX: 23.82 KG/M2 | HEART RATE: 73 BPM | OXYGEN SATURATION: 99 % | TEMPERATURE: 97.3 F

## 2022-03-08 DIAGNOSIS — G47.33 OBSTRUCTIVE SLEEP APNEA (ADULT) (PEDIATRIC): ICD-10-CM

## 2022-03-08 DIAGNOSIS — G47.50 PARASOMNIA, UNSPECIFIED TYPE: Primary | ICD-10-CM

## 2022-03-08 PROCEDURE — 99204 OFFICE O/P NEW MOD 45 MIN: CPT | Performed by: INTERNAL MEDICINE

## 2022-03-08 PROCEDURE — G8399 PT W/DXA RESULTS DOCUMENT: HCPCS | Performed by: INTERNAL MEDICINE

## 2022-03-08 PROCEDURE — 3017F COLORECTAL CA SCREEN DOC REV: CPT | Performed by: INTERNAL MEDICINE

## 2022-03-08 PROCEDURE — G8432 DEP SCR NOT DOC, RNG: HCPCS | Performed by: INTERNAL MEDICINE

## 2022-03-08 PROCEDURE — 1101F PT FALLS ASSESS-DOCD LE1/YR: CPT | Performed by: INTERNAL MEDICINE

## 2022-03-08 PROCEDURE — 1090F PRES/ABSN URINE INCON ASSESS: CPT | Performed by: INTERNAL MEDICINE

## 2022-03-08 PROCEDURE — G9899 SCRN MAM PERF RSLTS DOC: HCPCS | Performed by: INTERNAL MEDICINE

## 2022-03-08 PROCEDURE — G8427 DOCREV CUR MEDS BY ELIG CLIN: HCPCS | Performed by: INTERNAL MEDICINE

## 2022-03-08 PROCEDURE — G8420 CALC BMI NORM PARAMETERS: HCPCS | Performed by: INTERNAL MEDICINE

## 2022-03-08 PROCEDURE — G8536 NO DOC ELDER MAL SCRN: HCPCS | Performed by: INTERNAL MEDICINE

## 2022-03-08 NOTE — PROGRESS NOTES
[address] [facility] [place]. [place], [address]  Tel.  [phone]  Fax. 100 [facility] 60  [place], [address] [facility]  Tel.  [phone]  Fax. [phone] [address]  Tel.  [phone]  Fax. [phone]         Subjective:      Grace Sandra is an 66 y.o. female referred for evaluation for a sleep disorder. She complains of longstanding history of waking with feeling of panic and sometimes with a scream (with no recollection of dreaming/nightmares associated with feeling that she cannot take a breath and arms are numb. She may have one to several episodes per night (usually occurring if first part of night). Symptoms began over 35 years ago years ago, gradually worsening since that time. She says recently over past month she wakes up feeling like she cannot breathe or her throat or lungs are almost paralyzed on waking She usually can fall asleep in 20-40  minutes. Family or house members note the scream/yell/wimper or similar sound . She denies falling asleep while driving. Grace Sandra does wake up frequently at night. She is bothered by waking up too early and left unable to get back to sleep. She actually sleeps about 4 hours at night and wakes up about   times during the night. She does not work shifts: Pinky Angles Lonne Second indicates she does get too little sleep at night. Her bedtime is 2330. She awakens at 0830. She does not take naps. . She has the following observed behaviors:  (panic attacks);  (arms/hands paralysis). Other remarks:  she wakes up with a very dry throat  She said she had a sleep test negative for sleep apnea about 25 years ago. However, she said she slept very poorly in the lab.   [place] Sleepiness Score: 8       Allergies   Allergen Reactions    Penicillin G Other (comments)     Told by parents, not sure    Shellfish Derived Angioedema         Current Outpatient Medications:     diazePAM (Valium) 5 mg tablet, Take 1 Tablet by mouth nightly as needed for Anxiety or Sleep. Max Daily Amount: 5 mg., Disp: 15 Tablet, Rfl: 0    levothyroxine (SYNTHROID) 50 mcg tablet, TAKE 1 TABLET BY MOUTH EVERY DAY BEFORE BREAKFAST, Disp: 30 Tablet, Rfl: 2    COLLAGEN, by Does Not Apply route., Disp: , Rfl:     zinc sulfate (ZINC-15 PO), Take  by mouth., Disp: , Rfl:     ascorbic acid, vitamin C, (Vitamin C) 500 mg tablet, Take 3,000 mg by mouth., Disp: , Rfl:     cyanocobalamin 1,000 mcg tablet, Take 1,000 mcg by mouth daily. , Disp: , Rfl:     potassium 99 mg tablet, Take 99 mg by mouth daily. 3 =times a week, Disp: , Rfl:     cholecalciferol (VITAMIN D3) 1,000 unit tablet, Take 5,000 Units by mouth daily. , Disp: , Rfl:     tumeric-ging-olive-oreg-capryl 100 mg-150 mg- 50 mg-150 mg cap, Take  by mouth. (Patient not taking: Reported on 2/14/2022), Disp: , Rfl:     GINGER ROOT, BULK,, by Does Not Apply route. (Patient not taking: Reported on 3/8/2022), Disp: , Rfl:      She  has a past medical history of Hepatitis A. She  has a past surgical history that includes colonoscopy (N/A, 11/11/2016) and hx gyn. She family history includes Cancer in her mother; [place] in her mother; Heart Disease in her father; Hypertension in her mother; No Known Problems in her brother and sister. She  reports that she quit smoking about 51 years ago. She has a 0.50 pack-year smoking history. She has never used smokeless tobacco. She reports current alcohol use. She reports that she does not use drugs. Review of Systems:  Constitutional:  No significant weight loss or weight gain. Eyes:  No blurred vision.   CVS:  No significant chest pain  Pulm:  No significant shortness of breath  GI:  No significant nausea or vomiting  :  No significant nocturia  Musculoskeletal:  No significant joint pain at night  Skin:  No significant rashes  Neuro:  No significant dizziness   Psych:  No active mood issues    Sleep Review of Systems: notable for some difficulty falling asleep; +frequent awakenings at night;  regular dreaming noted; no nightmares ; no early morning headaches; no memory problems; no concentration issues; Objective:     Visit Vitals  BP (!) 114/56 (BP 1 Location: Right arm, BP Patient Position: Sitting, BP Cuff Size: Large adult)   Pulse 73   Temp 97.3 °F (36.3 °C) (Temporal)   Resp 20   Ht 5' 4\" (1.626 m)   Wt 139 lb 8 oz (63.3 kg)   SpO2 99%   BMI 23.95 kg/m²         General:   Not in acute distress   Eyes:  Anicteric sclerae, no obvious strabismus   Nose:  No obvious nasal septum deviation    Oropharynx:   Class 3 oropharyngeal outlet , low-lying soft palate    Tonsils:   tonsils are present and normal   Neck:    ; midline trachea   Chest/Lungs:  Equal lung expansion, clear on auscultation    CVS:  Normal rate, regular rhythm; no JVD   Skin:  Warm to touch; no obvious rashes   Neuro:  No focal deficits ; no obvious tremor    Psych:  Normal affect,  normal countenance;          Assessment:       ICD-10-CM ICD-9-CM    1. Parasomnia, unspecified type  G47.50 307.47 POLYSOMNOGRAPHY 1 NIGHT   2. Obstructive sleep apnea (adult) (pediatric)  G47.33 327.23 POLYSOMNOGRAPHY 1 NIGHT         Plan: 1. Parasomnia- proper sleep hygiene/environment reviewed. She should continue to ensure sufficient sleep time (free of electronics or light). She should ensure a safe sleeping environment. Polysomnogram ordered for further evaluation. 2. Possible JIE - low STOP BANG (2), but some of her symptoms could be explained by sleep apnea. Polysomnogram ordered. * PSG was ordered for initial evaluation. * She was provided information on sleep apnea including coresponding risk factors and the importance of proper treatment. * Counseling was provided regarding proper sleep hygiene and safe driving.   * Return for follow-up shortly after sleep study to go over results and to discuss treatment options if indicated (can be virtual). .    Thank you for allowing us to participate in your patient's medical care. We'll keep you updated on these investigations.      Electronically signed by    Cory Lynch MD  Diplomate in Sleep Medicine  [facility]     3-8-22

## 2022-03-08 NOTE — Clinical Note
Thank you for the referral.  I will keep you informed of her progress.   [address],  [place]

## 2022-03-08 NOTE — PATIENT INSTRUCTIONS
217 [facility]., [place]. [place], [address]  Tel.  [phone]  Fax. 100 [facility] 60  [place], [address] [facility]  Tel.  [phone]  Fax. [phone] [address]  Tel.  [phone]  Fax. [phone]     PROPER SLEEP HYGIENE    What to avoid  · Do not have drinks with caffeine, such as coffee or black tea, for 8 hours before bed. · Do not smoke or use other types of tobacco near bedtime. Nicotine is a stimulant and can keep you awake. · Avoid drinking alcohol late in the evening, because it can cause you to wake in the middle of the night. · Do not eat a big meal close to bedtime. If you are hungry, eat a light snack. · Do not drink a lot of water close to bedtime, because the need to urinate may wake you up during the night. · Do not read or watch TV in bed. Use the bed only for sleeping and sexual activity. What to try  · Go to bed at the same time every night, and wake up at the same time every morning. Do not take naps during the day. · Keep your bedroom quiet, dark, and cool. · Get regular exercise, but not within 3 to 4 hours of your bedtime. .  · Sleep on a comfortable pillow and mattress. · If watching the clock makes you anxious, turn it facing away from you so you cannot see the time. · If you worry when you lie down, start a worry book. Well before bedtime, write down your worries, and then set the book and your concerns aside. · Try meditation or other relaxation techniques before you go to bed. · If you cannot fall asleep, get up and go to another room until you feel sleepy. Do something relaxing. Repeat your bedtime routine before you go to bed again. · Make your house quiet and calm about an hour before bedtime. Turn down the lights, turn off the TV, log off the computer, and turn down the volume on music. This can help you relax after a busy day.     Drowsy Driving  The [address] [place] [address] Road Traffic Safety Administration cites drowsiness as a causing factor in more than 081,171 police reported crashes annually, resulting in 76,000 injuries and 1,500 deaths. Other surveys suggest 55% of people polled have driven while drowsy in the past year, 23% had fallen asleep but not crashed, 3% crashed, and 2% had and accident due to drowsy driving. Who is at risk? Young Drivers: One study of drowsy driving accidents states that 55% of the drivers were under 25 years. Of those, 75% were male. Shift Workers and Travelers: People who work overnight or travel across time zones frequently are at higher risk of experiencing Circadian Rhythm Disorders. They are trying to work and function when their body is programed to sleep. Sleep Deprived: Lack of sleep has a serious impact on your ability to pay attention or focus on a task. Consistently getting less than the average of 8 hours your body needs creates partial or cumulative sleep deprivation. Untreated Sleep Disorders: Sleep Apnea, Narcolepsy, R.L.S., and other sleep disorders (untreated) prevent a person from getting enough restful sleep. This leads to excessive daytime sleepiness and increases the risk for drowsy driving accidents by up to 7 times. Medications / Alcohol: Even over the counter medications can cause drowsiness. Medications that impair a drivers attention should have a warning label. Alcohol naturally makes you sleepy and on its own can cause accidents. Combined with excessive drowsiness its effects are amplified. Signs of Drowsy Driving:   * You don't remember driving the last few miles   * You may drift out of your brooke   * You are unable to focus and your thoughts wander   * You may yawn more often than normal   * You have difficulty keeping your eyes open / nodding off   * Missing traffic signs, speeding, or tailgating  Prevention-   Good sleep hygiene, lifestyle and behavioral choices have the most impact on drowsy driving.  There is no substitute for sleep and the average person requires 8 hours nightly. If you find yourself driving drowsy, stop and sleep. Consider the sleep hygiene tips provided during your visit as well. Medication Refill Policy: Refills for all medications require 1 week advance notice. Please have your pharmacy fax a refill request. We are unable to fax, or call in \"controled substance\" medications and you will need to pick these prescriptions up from our office. [employer] Activation    Thank you for requesting access to IPG. Please follow the instructions below to securely access and download your online medical record. IPG allows you to send messages to your doctor, view your test results, renew your prescriptions, schedule appointments, and more. How Do I Sign Up? 1. In your internet browser, go to [email or web address]. 2. Click on the First Time User? Click Here link in the Sign In box. You will see the New Member Sign Up page. 3. Enter your IPG Access Code exactly as it appears below. You will not need to use this code after youve completed the sign-up process. If you do not sign up before the expiration date, you must request a new code. IPG Access Code: Activation code not generated  Current IPG Status: Active (This is the date your IPG access code will )    4. Enter the last four digits of your Social Security Number (xxxx) and Date of Birth (mm/dd/yyyy) as indicated and click Submit. You will be taken to the next sign-up page. 5. Create a IPG ID. This will be your IPG login ID and cannot be changed, so think of one that is secure and easy to remember. 6. Create a IPG password. You can change your password at any time. 7. Enter your Password Reset Question and Answer. This can be used at a later time if you forget your password. 8. Enter your e-mail address. You will receive e-mail notification when new information is available in [address]   9. Click Sign Up. You can now view and download portions of your medical record. 10. Click the Download Summary menu link to download a portable copy of your medical information. Additional Information    If you have questions, please call [phone]. Remember, Flexiroam is NOT to be used for urgent needs. For medical emergencies, dial 911.

## 2022-03-28 ENCOUNTER — HOSPITAL ENCOUNTER (OUTPATIENT)
Dept: SLEEP MEDICINE | Age: 72
Discharge: HOME | End: 2022-03-28
Payer: MEDICARE

## 2022-03-28 VITALS
DIASTOLIC BLOOD PRESSURE: 68 MMHG | OXYGEN SATURATION: 95 % | HEART RATE: 78 BPM | WEIGHT: 139 LBS | BODY MASS INDEX: 23.73 KG/M2 | TEMPERATURE: 98.4 F | SYSTOLIC BLOOD PRESSURE: 125 MMHG | HEIGHT: 64 IN

## 2022-03-28 DIAGNOSIS — G47.50 PARASOMNIA, UNSPECIFIED TYPE: ICD-10-CM

## 2022-03-28 DIAGNOSIS — G47.33 OBSTRUCTIVE SLEEP APNEA (ADULT) (PEDIATRIC): ICD-10-CM

## 2022-03-28 PROCEDURE — 95810 POLYSOM 6/> YRS 4/> PARAM: CPT | Performed by: INTERNAL MEDICINE

## 2022-03-29 ENCOUNTER — TELEPHONE (OUTPATIENT)
Dept: SLEEP MEDICINE | Age: 72
End: 2022-03-29

## 2022-03-31 ENCOUNTER — DOCUMENTATION ONLY (OUTPATIENT)
Dept: SLEEP MEDICINE | Age: 72
End: 2022-03-31

## 2022-04-05 ENCOUNTER — VIRTUAL VISIT (OUTPATIENT)
Dept: SLEEP MEDICINE | Age: 72
End: 2022-04-05
Payer: MEDICARE

## 2022-04-05 DIAGNOSIS — G47.33 OBSTRUCTIVE SLEEP APNEA (ADULT) (PEDIATRIC): Primary | ICD-10-CM

## 2022-04-05 DIAGNOSIS — G47.50 PARASOMNIA, UNSPECIFIED TYPE: ICD-10-CM

## 2022-04-05 PROCEDURE — 1101F PT FALLS ASSESS-DOCD LE1/YR: CPT | Performed by: INTERNAL MEDICINE

## 2022-04-05 PROCEDURE — 1090F PRES/ABSN URINE INCON ASSESS: CPT | Performed by: INTERNAL MEDICINE

## 2022-04-05 PROCEDURE — G8427 DOCREV CUR MEDS BY ELIG CLIN: HCPCS | Performed by: INTERNAL MEDICINE

## 2022-04-05 PROCEDURE — 99213 OFFICE O/P EST LOW 20 MIN: CPT | Performed by: INTERNAL MEDICINE

## 2022-04-05 PROCEDURE — G8432 DEP SCR NOT DOC, RNG: HCPCS | Performed by: INTERNAL MEDICINE

## 2022-04-05 PROCEDURE — G8399 PT W/DXA RESULTS DOCUMENT: HCPCS | Performed by: INTERNAL MEDICINE

## 2022-04-05 PROCEDURE — 3017F COLORECTAL CA SCREEN DOC REV: CPT | Performed by: INTERNAL MEDICINE

## 2022-04-05 PROCEDURE — G9899 SCRN MAM PERF RSLTS DOC: HCPCS | Performed by: INTERNAL MEDICINE

## 2022-04-05 NOTE — PATIENT INSTRUCTIONS
[address] [facility] [address] [place]. [place], [address]  Tel.  [phone]  Fax. 100 [facility] 60  [place], [address] [facility]  Tel.  [phone]  Fax. [phone] 9[address] [facility], [place] [address]  Tel.  [phone]  Fax. [phone]     Learning About CPAP for Sleep Apnea  What is CPAP? CPAP is a small machine that you use at home every night while you sleep. It increases air pressure in your throat to keep your airway open. When you have sleep apnea, this can help you sleep better so you feel much better. CPAP stands for \"continuous positive airway pressure. \"  The CPAP machine will have one of the following:  · A mask that covers your nose and mouth  · Prongs that fit into your nose  · A mask that covers your nose only, the most common type. This type is called NCPAP. The N stands for \"nasal.\"  Why is it done? CPAP is usually the best treatment for obstructive sleep apnea. It is the first treatment choice and the most widely used. Your doctor may suggest CPAP if you have:  · Moderate to severe sleep apnea. · Sleep apnea and coronary artery disease (CAD) or heart failure. How does it help? · CPAP can help you have more normal sleep, so you feel less sleepy and more alert during the daytime. · CPAP may help keep heart failure or other heart problems from getting worse. · NCPAP may help lower your blood pressure. · If you use CPAP, your bed partner may also sleep better because you are not snoring or restless. What are the side effects? Some people who use CPAP have:  · A dry or stuffy nose and a sore throat. · Irritated skin on the face. · Sore eyes. · Bloating. If you have any of these problems, work with your doctor to fix them. Here are some things you can try:  · Be sure the mask or nasal prongs fit well. · See if your doctor can adjust the pressure of your CPAP. · If your nose is dry, try a humidifier.   · If your nose is runny or stuffy, try decongestant medicine or a steroid nasal spray. If these things do not help, you might try a different type of machine. Some machines have air pressure that adjusts on its own. Others have air pressures that are different when you breathe in than when you breathe out. This may reduce discomfort caused by too much pressure in your nose. Where can you learn more? Go to dELiAs.be  Enter Giana Vazquez in the search box to learn more about \"Learning About CPAP for Sleep Apnea. \"   © 3786-3207 Healthwise, Incorporated. Care instructions adapted under license by [employer] (which disclaims liability or warranty for this information). This care instruction is for use with your licensed healthcare professional. If you have questions about a medical condition or this instruction, always ask your healthcare professional. [address] any warranty or liability for your use of this information. Content Version: 3.0.18707; Last Revised: January 11, 2010  PROPER SLEEP HYGIENE    What to avoid  · Do not have drinks with caffeine, such as coffee or black tea, for 8 hours before bed. · Do not smoke or use other types of tobacco near bedtime. Nicotine is a stimulant and can keep you awake. · Avoid drinking alcohol late in the evening, because it can cause you to wake in the middle of the night. · Do not eat a big meal close to bedtime. If you are hungry, eat a light snack. · Do not drink a lot of water close to bedtime, because the need to urinate may wake you up during the night. · Do not read or watch TV in bed. Use the bed only for sleeping and sexual activity. What to try  · Go to bed at the same time every night, and wake up at the same time every morning. Do not take naps during the day. · Keep your bedroom quiet, dark, and cool. · Get regular exercise, but not within 3 to 4 hours of your bedtime. .  · Sleep on a comfortable pillow and mattress.   · If watching the clock makes you anxious, turn it facing away from you so you cannot see the time. · If you worry when you lie down, start a worry book. Well before bedtime, write down your worries, and then set the book and your concerns aside. · Try meditation or other relaxation techniques before you go to bed. · If you cannot fall asleep, get up and go to another room until you feel sleepy. Do something relaxing. Repeat your bedtime routine before you go to bed again. · Make your house quiet and calm about an hour before bedtime. Turn down the lights, turn off the TV, log off the computer, and turn down the volume on music. This can help you relax after a busy day. Drowsy Driving: The [employer] cites drowsiness as a causing factor in more than 127,808 police reported crashes annually, resulting in 76,000 injuries and 1,500 deaths. Other surveys suggest 55% of people polled have driven while drowsy in the past year, 23% had fallen asleep but not crashed, 3% crashed, and 2% had and accident due to drowsy driving. Who is at risk? Young Drivers: One study of drowsy driving accidents states that 55% of the drivers were under 25 years. Of those, 75% were male. Shift Workers and Travelers: People who work overnight or travel across time zones frequently are at higher risk of experiencing Circadian Rhythm Disorders. They are trying to work and function when their body is programed to sleep. Sleep Deprived: Lack of sleep has a serious impact on your ability to pay attention or focus on a task. Consistently getting less than the average of 8 hours your body needs creates partial or cumulative sleep deprivation. Untreated Sleep Disorders: Sleep Apnea, Narcolepsy, R.L.S., and other sleep disorders (untreated) prevent a person from getting enough restful sleep. This leads to excessive daytime sleepiness and increases the risk for drowsy driving accidents by up to 7 times.   Medications / Alcohol: Even over the counter medications can cause drowsiness. Medications that impair a drivers attention should have a warning label. Alcohol naturally makes you sleepy and on its own can cause accidents. Combined with excessive drowsiness its effects are amplified. Signs of Drowsy Driving:   * You don't remember driving the last few miles   * You may drift out of your brooke   * You are unable to focus and your thoughts wander   * You may yawn more often than normal   * You have difficulty keeping your eyes open / nodding off   * Missing traffic signs, speeding, or tailgating  Prevention-   Good sleep hygiene, lifestyle and behavioral choices have the most impact on drowsy driving. There is no substitute for sleep and the average person requires 8 hours nightly. If you find yourself driving drowsy, stop and sleep. Consider the sleep hygiene tips provided during your visit as well. Medication Refill Policy: Refills for all medications require 1 week advance notice. Please have your pharmacy fax a refill request. We are unable to fax, or call in \"controled substance\" medications and you will need to pick these prescriptions up from our office. A[employer]igator [employer] Activation    Thank you for requesting access to [employer]. Please follow the instructions below to securely access and download your online medical record. A[employer]igator Internet allows you to send messages to your doctor, view your test results, renew your prescriptions, schedule appointments, and more. How Do I Sign Up? 1. In your internet browser, go to [email or web address] 2. Click on the First Time User? Click Here link in the Sign In box. You will see the New Member Sign Up page. 3. Enter your A[employer]igator Internet Access Code exactly as it appears below. You will not need to use this code after youve completed the sign-up process. If you do not sign up before the expiration date, you must request a new code. A[employer]igator Internet Access Code:  Activation code not generated  Current Merchant Exchange Status: Active (This is the date your Merchant Exchange access code will )    4. Enter the last four digits of your Social Security Number (xxxx) and Date of Birth (mm/dd/yyyy) as indicated and click Submit. You will be taken to the next sign-up page. 5. Create a AthleteTraxt ID. This will be your Merchant Exchange login ID and cannot be changed, so think of one that is secure and easy to remember. 6. Create a Mer[employer] Exchange password. You can change your password at any time. 7. Enter your Password Reset Question and Answer. This can be used at a later time if you forget your password. 8. Enter your e-mail address. You will receive e-mail notification when new information is available in [address] 9. Click Sign Up. You can now view and download portions of your medical record. 10. Click the Download Summary menu link to download a portable copy of your medical information. Additional Information    If you have questions, please call [phone]. Remember, Merchant Exchange is NOT to be used for urgent needs. For medical emergencies, dial 911.

## 2022-04-05 NOTE — PROGRESS NOTES
[address] [place]. [place], [address]  Tel.  [phone]  Fax. [address] [facility] 60  [place], [address] [facility]  Tel.  [phone]  Fax. [phone] [address] [facility] Holly Schmid   Tel.  [phone]  Fax. [phone]       Telemedicine visit performed with verbal consent of the patient. Patient called and identity confirmed with 2 patient identifers  Patient was seen at home  Sarah Larry is a 66 y.o. female who was seen by synchronous (real-time) audio-video technology on 4/5/2022. Sarah Larry, who was evaluated through a synchronous (real-time) audio-video encounter, and/or her healthcare decision maker, is aware that it is a billable service, which includes applicable co-pays, with coverage as determined by her insurance carrier. She provided verbal consent to proceed and patient identification was verified. This visit was conducted pursuant to the emergency declaration under the [address] [place] [address], [address] [place] [address] authority and the [employer] and [employer] General Act. A caregiver was present when appropriate. Ability to conduct physical exam was limited. The patient was located at home in a state where the provider was licensed to provide care. --Fer Dash MD on 4/5/2022 at 3:37 PM                I was in the office while conducting this encounter. S>Ros Alexander is a 66 y.o. female seen at this telemedicine visit for a results follow-up. Results reviewed with her in detail. See report for further details. Sleep study showed significant sleep apnea AHI 10/hour with oxygen desaturations as low as 81 % (cumulative time less than 88% was 10 minutes). Mild snoring. 2 episodes of sleep talking in stage 3 sleep. No screaming in sleep.  She did not have any arm numbness or panic attacks during sleep test.  Allergies   Allergen Reactions    Penicillin G Other (comments)     Told by parents, not sure    Shellfish Derived Angioedema       She has a current medication list which includes the following prescription(s): diazepam, levothyroxine, collagen, zinc sulfate, ascorbic acid (vitamin c), cyanocobalamin, potassium, cholecalciferol, tumeric-ging-olive-oreg-capryl, and esmer root. .      She  has a past medical history of Hepatitis A.         O>      Weight 136 lb  Vital Signs: (As obtained by patient/caregiver at home)        Constitutional: [x] Appears well-developed and well-nourished [x] No apparent distress      [] Abnormal -     Mental status: [x] Alert and awake  [x] Oriented to person/place/time [x] Able to follow commands    [] Abnormal -     Eyes:   EOM    [x]  Normal    [] Abnormal -   Sclera  [x]  Normal    [] Abnormal -          Discharge [x]  None visible   [] Abnormal -     HENT: [x] Normocephalic, atraumatic  [] Abnormal -     External Ears [x] Normal  [] Abnormal -    Neck: [x] No visualized mass [] Abnormal -     Pulmonary/Chest: [x] Respiratory effort normal   [x] No visualized signs of difficulty breathing or respiratory distress        [] Abnormal -       Neurological:        [x] No Facial Asymmetry (Cranial nerve 7 motor function) (limited exam due to video visit)          [x] No gaze palsy        [] Abnormal -          Skin:        [x] No significant exanthematous lesions or discoloration noted on facial skin         [] Abnormal -            Psychiatric:       [x] Normal Affect [] Abnormal -        Other pertinent observable physical exam findings:-            A>    ICD-10-CM ICD-9-CM    1. Obstructive sleep apnea (adult) (pediatric)  G47.33 327.23 SLEEP LAB (PAP TITRATION)   2. Parasomnia, unspecified type  G47.50 307.47      P>  JIE - Treatment options for sleep apnea were reviewed. She will come in for a PAP titration. I will order a PAP for her home use when results/settings known. 2. Parasomnia - we discussed non-REM parasomnias.  Safe sleeping environment advised. We discussed relaxation strategies;avoiding tv close to bedtime. We discussed some possible medications to consider but first I would like to see if treating the apnea, reduces or eliminates some of these symptoms. All of her questions were addressed. Pursuant to the emergency declaration under the [address] [place] [address], [facility] waiver authority and the [employer] and [employer] Act, this Virtual  Visit was conducted, with patient's consent, to reduce the patient's risk of exposure to COVID-19 and provide continuity of care for an established patient. Services were provided through a video synchronous discussion virtually to substitute for in-person clinic visit.     Felicia Sierra MD    Electronically signed by    Joel Fernandez MD  Diplomate in Sleep Medicine  [facility]

## 2022-04-09 RX ORDER — LEVOTHYROXINE SODIUM 50 UG/1
TABLET ORAL
Qty: 30 TABLET | Refills: 2 | Status: SHIPPED | OUTPATIENT
Start: 2022-04-09 | End: 2022-07-06

## 2022-04-29 ENCOUNTER — TELEPHONE (OUTPATIENT)
Dept: SLEEP MEDICINE | Age: 72
End: 2022-04-29

## 2022-05-03 ENCOUNTER — DOCUMENTATION ONLY (OUTPATIENT)
Dept: SLEEP MEDICINE | Age: 72
End: 2022-05-03

## 2022-05-03 ENCOUNTER — TELEPHONE (OUTPATIENT)
Dept: SLEEP MEDICINE | Age: 72
End: 2022-05-03

## 2022-05-03 ENCOUNTER — HOSPITAL ENCOUNTER (OUTPATIENT)
Dept: SLEEP MEDICINE | Age: 72
Discharge: HOME | End: 2022-05-03
Payer: MEDICARE

## 2022-05-03 VITALS
HEIGHT: 64 IN | HEART RATE: 75 BPM | BODY MASS INDEX: 23.73 KG/M2 | OXYGEN SATURATION: 96 % | TEMPERATURE: 98.8 F | SYSTOLIC BLOOD PRESSURE: 113 MMHG | WEIGHT: 139 LBS | DIASTOLIC BLOOD PRESSURE: 71 MMHG

## 2022-05-03 DIAGNOSIS — G47.33 OBSTRUCTIVE SLEEP APNEA (ADULT) (PEDIATRIC): Primary | ICD-10-CM

## 2022-05-03 DIAGNOSIS — G47.33 OBSTRUCTIVE SLEEP APNEA (ADULT) (PEDIATRIC): ICD-10-CM

## 2022-05-03 PROCEDURE — 95811 POLYSOM 6/>YRS CPAP 4/> PARM: CPT | Performed by: INTERNAL MEDICINE

## 2022-05-04 NOTE — PROGRESS NOTES
[address] [facility]. [place], [address]  Tel.  [phone]  Fax. 100 [facility] 60  [place], [address] [facility]  Tel.  [phone]  Fax. [phone] [address]  Tel.  [phone]  Fax. [phone]     Sleep Study Technical Notes        PRE-Test:  · Lemuel Falcon (: 1950) arrived on time. Vitals taken: temperature (98.8 ) BP (113/71) and SpO2 (96%) HR (75). She was shown directly to her room. Paperwork completed. She is here for a CPAP titration study. Procedure was explained to the patient and questions were answered  She expressed understanding. A mask fitting was performed with a nasal mask. She said her  used to wear CPAP using nasal pillows but the mask irritated his skin and he got repeated sinus infections. She was fit with a standard nasal.  She like the memory foam cushion. Electrodes were applied without incident. The nasal mask was applied. When the air was activated, she said it made her feel like she was smothering. She stated she wanted to try nasal pillows like her  used to use. The mask was changed and she said the nasal pillows were an improvement but still not great. Tech changed to an under the nose cushion. She said this was somewhat better. She had trouble breathing out and EPR was activated at a setting of 2. She felt she could tolerate it enough to try to go to sleep. Once settled in bed, the study was started.     Acquisition Notes:  · Lights off: 10:40pm  · Sleep onset: 10:48pm  · Respiratory events: central, hypopnea  · ECG:  NSR  · PAP titration: CPAP  · Mask(s) Used:   · Res Med N20 Air Touch for her - small  · Res Med P30i nasal mask - small pillows  · Res Med N30i nasal mask - small cushion (preferred)  · Other comments:   · An extra blanket was provided  · Two bathroom breaks  · One episode of crying out was documented in epoch 1015 at 5:09am.  · Final Pressure:  CPAP 8cm w/EPR=3, No humidity. · The patient awoke at 5:30am.    POST Test:  Patient awakened. Mask and electrodes were removed. Post Sleep Questionnaire completed. Patient stated that she was alert and ok to drive. Equipment and room cleaned per infection control policy.

## 2022-05-11 NOTE — TELEPHONE ENCOUNTER
Results of sleep study in [facility]  Lead tech to convey results to patient    PAP titration was performed to optimize airflow settings to control her apnea/respiratory events. It was found that a setting of 7 cmH20 adequately controlled her respiratory events. Oxygen saturation was maintained at or above 89% at this setting. All stages of sleep recorded. Significant REM sleep noted. No unusual movements noted by tech. As discussed, I will order a PAP device for her home use. It will start a little lower than the final pressure setting in the lab ,for her comfort,and will adjust up during the night. she should be seen in the sleep center after she has been on PAP for 4-6 weeks. We will assess how she is doing on PAP therapy and make any further adjustments (if needed). Patient should call the office the day she gets set up with new PAP device so we can schedule her for an adherence/compliance visit within 31-90 days of obtaining a new device. PAP order attached.   Staff to kindly order PAP and call patient and let them know which [employer] company they should be hearing from.    (patient will need a first adherence visit after 4-6 weeks on therapy)

## 2022-05-12 ENCOUNTER — DOCUMENTATION ONLY (OUTPATIENT)
Dept: SLEEP MEDICINE | Age: 72
End: 2022-05-12

## 2022-05-13 ENCOUNTER — DOCUMENTATION ONLY (OUTPATIENT)
Dept: SLEEP MEDICINE | Age: 72
End: 2022-05-13

## 2022-05-13 NOTE — TELEPHONE ENCOUNTER
Reviewed sleep study results with patient. She expressed understanding and is willing to proceed with a trial of APAP. Front staff to forward DME order.

## 2022-06-14 ENCOUNTER — TELEPHONE (OUTPATIENT)
Dept: SLEEP MEDICINE | Age: 72
End: 2022-06-14

## 2022-06-14 NOTE — TELEPHONE ENCOUNTER
Patient called into the office on 06/14/2022 at 1:49pm stating that she was doing fine on her pap device for about 3 weeks now she is having panic attacks and night terrors. Patient would also like to make sure she is cleaning her device properly. Patient can be reached back at [phone].

## 2022-06-15 ENCOUNTER — TELEPHONE (OUTPATIENT)
Dept: SLEEP MEDICINE | Age: 72
End: 2022-06-15

## 2022-06-15 NOTE — TELEPHONE ENCOUNTER
Patient left a message requesting to speak with the tech or provider in regards to making adjustments on her PAP. She stated she is unable to tolerate it for the minimum 4 hours, is having increased symptoms and would like to request the device to be adjusted. She would like a call back. Routed to tech to advise.

## 2022-06-21 ENCOUNTER — TELEPHONE (OUTPATIENT)
Dept: SLEEP MEDICINE | Age: 72
End: 2022-06-21

## 2022-06-22 ENCOUNTER — VIRTUAL VISIT (OUTPATIENT)
Dept: SLEEP MEDICINE | Age: 72
End: 2022-06-22
Payer: MEDICARE

## 2022-06-22 ENCOUNTER — DOCUMENTATION ONLY (OUTPATIENT)
Dept: SLEEP MEDICINE | Age: 72
End: 2022-06-22

## 2022-06-22 VITALS — BODY MASS INDEX: 23.73 KG/M2 | WEIGHT: 139 LBS | HEIGHT: 64 IN

## 2022-06-22 DIAGNOSIS — G47.33 OBSTRUCTIVE SLEEP APNEA (ADULT) (PEDIATRIC): Primary | ICD-10-CM

## 2022-06-22 DIAGNOSIS — G47.50 PARASOMNIA, UNSPECIFIED TYPE: ICD-10-CM

## 2022-06-22 PROCEDURE — 1101F PT FALLS ASSESS-DOCD LE1/YR: CPT | Performed by: NURSE PRACTITIONER

## 2022-06-22 PROCEDURE — G8420 CALC BMI NORM PARAMETERS: HCPCS | Performed by: NURSE PRACTITIONER

## 2022-06-22 PROCEDURE — G8427 DOCREV CUR MEDS BY ELIG CLIN: HCPCS | Performed by: NURSE PRACTITIONER

## 2022-06-22 PROCEDURE — G9899 SCRN MAM PERF RSLTS DOC: HCPCS | Performed by: NURSE PRACTITIONER

## 2022-06-22 PROCEDURE — G8399 PT W/DXA RESULTS DOCUMENT: HCPCS | Performed by: NURSE PRACTITIONER

## 2022-06-22 PROCEDURE — G8536 NO DOC ELDER MAL SCRN: HCPCS | Performed by: NURSE PRACTITIONER

## 2022-06-22 PROCEDURE — 1090F PRES/ABSN URINE INCON ASSESS: CPT | Performed by: NURSE PRACTITIONER

## 2022-06-22 PROCEDURE — G8432 DEP SCR NOT DOC, RNG: HCPCS | Performed by: NURSE PRACTITIONER

## 2022-06-22 PROCEDURE — 3017F COLORECTAL CA SCREEN DOC REV: CPT | Performed by: NURSE PRACTITIONER

## 2022-06-22 PROCEDURE — 99213 OFFICE O/P EST LOW 20 MIN: CPT | Performed by: NURSE PRACTITIONER

## 2022-06-22 PROCEDURE — 1123F ACP DISCUSS/DSCN MKR DOCD: CPT | Performed by: NURSE PRACTITIONER

## 2022-06-22 NOTE — PROGRESS NOTES
Alonso Nettles (: 1950) is a 66 y.o. female, established patient, seen for positive airway pressure follow-up, she was last seen by Dr. Ernst Cui on 2022, prior notes reviewed in detail. In lab sleep test 3/2022 showed AHI of 10.4/hr with a lowest SpO2 of 81%, duration of SpO2 < 88% 9.70 min. ASSESSMENT/PLAN:    ICD-10-CM ICD-9-CM    1. Obstructive sleep apnea (adult) (pediatric)  G47.33 327.23 AMB SUPPLY ORDER      AMB SUPPLY ORDER   2. Parasomnia, unspecified type  G47.50 307.47    3. Body mass index (BMI) 23.0-23.9, adult  Z68.23 V85.1        AHI = 10.4(3/2022). On ResMed APAP :  6-8 cmH2O. Set up 2022. She is adherent with PAP therapy and PAP continues to benefit patient and remains necessary for control of her sleep apnea. Her nighttime sleep disruption symptoms have recently resumed despite consistent PAP use. Follow-up and Dispositions    · Return in about 2 months (around 2022) for compliance follow up - virtual visit. 1. Sleep Apnea - Change pressure setting to  APAP 7-10 cm H2O.  EPR set to 3. Changes made by provider in [facility] system and sent to [facility]    *  Supplies ordered - nasal mask and heated tubing    Orders Placed This Encounter    AMB SUPPLY ORDER     Diagnosis: (G47.33) JIE (obstructive sleep apnea)  (primary encounter diagnosis)     Replacement Supplies for Positive Airway Pressure Therapy Device:   Duration of need: 99 months.  Nasal Cushion (Replace) 2 per month.  Nasal Interface Mask 1 every 3 months.  Pos Airway pressure chin strap   Headgear 1 every 6 months.  Tubing with heating element 1 every 3 months.  Filter(s) Disposable 2 per month.  Filter(s) Non-Disposable 1 every 6 months. .   [address] [facility] for Humidifier (Replace) 1 every 6 months. KRISHNA Serrano; NPI: [id number]    Electronically signed.  Date:- 22    AMB SUPPLY ORDER     Diagnosis: (G47.33) JIE (obstructive sleep apnea)  (primary encounter diagnosis)     Pressure change APAP to 7-10 cmH2O. EPR 3    Changes made in sleep lab. DARIEN ZavalaLUCA-BC; NPI: [id number]    Electronically signed. Date:- 06/22/22       * Counseling was provided regarding the importance of regular PAP use with emphasis on ensuring sufficient total sleep time, proper sleep hygiene, and consistent use. * Re-enforced proper and regular cleaning for the device. * She was asked to contact our office for any problems regarding PAP therapy. 2. Parasomnia - these had improved initially with PAP therapy but have recently returned. We will trial pressure increase. 3. Encouraged continued weight management program through appropriate diet and exercise regimen as healthy weight has been shown to reduce severity of obstructive sleep apnea. SUBJECTIVE/OBJECTIVE:    She  is seen today for follow up on PAP device and reports some problems using the device. The following concerns reviewed:    Drowsiness no Problems exhaling no   Snoring no Forget to put on no   Mask Comfortable yes Can't fall asleep no   Dry Mouth no Mask falls off no   Air Leaking no Frequent awakenings yes     She admits that her sleep has improved on PAP therapy using nasal mask and heated tubing. She notes that prior symptoms of waking up gasping, numbness in arms, and screaming in sleep went away when she first started using the device but have returned in the prior 2 weeks. She notes that she has been taking the mask off after 4 hours. We discussed using device entire sleep time and replacing cushion for better seal.  She reports turning off device each time she awakens, she will trial leaving device on and removing and then replacing if she feels she has to take off the mask. She denies arm numbness during the day, no problems with turning neck while reaching above head, no dizziness.  She has tried different sleeping positions which does not seem to affect the occurrence of numbness sensation. She notes headgear is reasonably comfortable. Review of device download indicated:  Auto pressure: 6-8 cmH2O; Max Pressure: 8.0 cmH2O;  95th percentile Pressure: 7.9 cmH2O   95th Percentile Leak: 26.8 L/Min     % Used Days >= 4 hours: 100. Avg hours used:  4:30. Therapy Apnea Index averaged over PAP use: 1.7 /hr which reflects improved sleep breathing condition. [place] Sleepiness Score: 13 and Modified F.O.S.Q. Score Total / 2: 13     Sleep Review of Systems: notable for Negative difficulty falling asleep; Positive awakenings at night; Negative early morning headaches; Negative memory problems; Some concentration issues; Negative chest pain; Negative shortness of breath; Negative significant joint pain at night; Negative rashes or itching; Negative heartburn; Negative significant mood issues; daily afternoon naps     Vitals reported by patient   Patient-Reported Vitals 4/5/2022   Patient-Reported Weight 136   Patient-Reported Pulse 78   Patient-Reported Temperature 98.4   Patient-Reported Systolic  207   Patient-Reported Diastolic 68      Calculated BMI 23    Physical Exam completed by visual and auditory observation of patient with verbal input from patient. General:   Alert, oriented, not in acute distress   Eyes:  Anicteric Sclerae; no obvious strabismus   Nose:  No obvious nasal septum deviation    Neck:   Midline trachea, no visible mass   Chest/Lungs:  Respiratory effort normal, no visualized signs of difficulty breathing or respiratory distress   CVS:  No JVD   Extremities:  No obvious rashes noted on face, neck, or hands   Neuro:  No facial asymmetry, no focal deficits; no obvious tremor    Psych:  Normal affect,  normal countenance     Manuel Bleacher, was evaluated through a synchronous (real-time) audio-video encounter.  The patient (or guardian if applicable) is aware that this is a billable service, which includes applicable co-pays. This Virtual Visit was conducted with patient's (and/or legal guardian's) consent. The visit was conducted pursuant to the emergency declaration under the [address] [place] [address] authority and the [employer] and [employer] General Act. Patient identification was verified, and a caregiver was present when appropriate. The patient was located at: Home: [id number] [facility] [id number]  The provider was located at: Facility (Appt Department): [address] [place] [phone]      --Alverda Cogan, NP on 6/22/2022 at 11:09 AM    An electronic signature was used to authenticate this note.

## 2022-06-22 NOTE — PATIENT INSTRUCTIONS
217 [facility] [place]. [place], [address]  Tel.  [phone]  Fax. 100 [facility] 60  [place], [address] [facility]  Tel.  [phone]  Fax. [phone] [address]  Tel.  [phone]  Fax. [phone]     Learning About CPAP for Sleep Apnea  What is CPAP? CPAP is a small machine that you use at home every night while you sleep. It increases air pressure in your throat to keep your airway open. When you have sleep apnea, this can help you sleep better so you feel much better. CPAP stands for \"continuous positive airway pressure. \"  The CPAP machine will have one of the following:  · A mask that covers your nose and mouth  · Prongs that fit into your nose  · A mask that covers your nose only, the most common type. This type is called NCPAP. The N stands for \"nasal.\"  Why is it done? CPAP is usually the best treatment for obstructive sleep apnea. It is the first treatment choice and the most widely used. Your doctor may suggest CPAP if you have:  · Moderate to severe sleep apnea. · Sleep apnea and coronary artery disease (CAD) or heart failure. How does it help? · CPAP can help you have more normal sleep, so you feel less sleepy and more alert during the daytime. · CPAP may help keep heart failure or other heart problems from getting worse. · NCPAP may help lower your blood pressure. · If you use CPAP, your bed partner may also sleep better because you are not snoring or restless. What are the side effects? Some people who use CPAP have:  · A dry or stuffy nose and a sore throat. · Irritated skin on the face. · Sore eyes. · Bloating. If you have any of these problems, work with your doctor to fix them. Here are some things you can try:  · Be sure the mask or nasal prongs fit well. · See if your doctor can adjust the pressure of your CPAP. · If your nose is dry, try a humidifier.   · If your nose is runny or stuffy, try decongestant medicine or a steroid nasal spray. If these things do not help, you might try a different type of machine. Some machines have air pressure that adjusts on its own. Others have air pressures that are different when you breathe in than when you breathe out. This may reduce discomfort caused by too much pressure in your nose. Where can you learn more? Go to appsplit.be  Enter Le Aquino in the search box to learn more about \"Learning About CPAP for Sleep Apnea. \"   © 7010-8969 Healthwise, Incorporated. Care instructions adapted under license by [employer] (which disclaims liability or warranty for this information). This care instruction is for use with your licensed healthcare professional. If you have questions about a medical condition or this instruction, always ask your healthcare professional. [address] any warranty or liability for your use of this information. Content Version: 1.7.25817; Last Revised: January 11, 2010  PROPER SLEEP HYGIENE    What to avoid  · Do not have drinks with caffeine, such as coffee or black tea, for 8 hours before bed. · Do not smoke or use other types of tobacco near bedtime. Nicotine is a stimulant and can keep you awake. · Avoid drinking alcohol late in the evening, because it can cause you to wake in the middle of the night. · Do not eat a big meal close to bedtime. If you are hungry, eat a light snack. · Do not drink a lot of water close to bedtime, because the need to urinate may wake you up during the night. · Do not read or watch TV in bed. Use the bed only for sleeping and sexual activity. What to try  · Go to bed at the same time every night, and wake up at the same time every morning. Do not take naps during the day. · Keep your bedroom quiet, dark, and cool. · Get regular exercise, but not within 3 to 4 hours of your bedtime. .  · Sleep on a comfortable pillow and mattress.   · If watching the clock makes you anxious, turn it facing away from you so you cannot see the time. · If you worry when you lie down, start a worry book. Well before bedtime, write down your worries, and then set the book and your concerns aside. · Try meditation or other relaxation techniques before you go to bed. · If you cannot fall asleep, get up and go to another room until you feel sleepy. Do something relaxing. Repeat your bedtime routine before you go to bed again. · Make your house quiet and calm about an hour before bedtime. Turn down the lights, turn off the TV, log off the computer, and turn down the volume on music. This can help you relax after a busy day. Drowsy Driving: The [employer] cites drowsiness as a causing factor in more than 821,932 police reported crashes annually, resulting in 76,000 injuries and 1,500 deaths. Other surveys suggest 55% of people polled have driven while drowsy in the past year, 23% had fallen asleep but not crashed, 3% crashed, and 2% had and accident due to drowsy driving. Who is at risk? Young Drivers: One study of drowsy driving accidents states that 55% of the drivers were under 25 years. Of those, 75% were male. Shift Workers and Travelers: People who work overnight or travel across time zones frequently are at higher risk of experiencing Circadian Rhythm Disorders. They are trying to work and function when their body is programed to sleep. Sleep Deprived: Lack of sleep has a serious impact on your ability to pay attention or focus on a task. Consistently getting less than the average of 8 hours your body needs creates partial or cumulative sleep deprivation. Untreated Sleep Disorders: Sleep Apnea, Narcolepsy, R.L.S., and other sleep disorders (untreated) prevent a person from getting enough restful sleep. This leads to excessive daytime sleepiness and increases the risk for drowsy driving accidents by up to 7 times.   Medications / Alcohol: Even over the counter medications can cause drowsiness. Medications that impair a drivers attention should have a warning label. Alcohol naturally makes you sleepy and on its own can cause accidents. Combined with excessive drowsiness its effects are amplified. Signs of Drowsy Driving:   * You don't remember driving the last few miles   * You may drift out of your brooke   * You are unable to focus and your thoughts wander   * You may yawn more often than normal   * You have difficulty keeping your eyes open / nodding off   * Missing traffic signs, speeding, or tailgating  Prevention-   Good sleep hygiene, lifestyle and behavioral choices have the most impact on drowsy driving. There is no substitute for sleep and the average person requires 8 hours nightly. If you find yourself driving drowsy, stop and sleep. Consider the sleep hygiene tips provided during your visit as well. Medication Refill Policy: Refills for all medications require 1 week advance notice. Please have your pharmacy fax a refill request. We are unable to fax, or call in \"controled substance\" medications and you will need to pick these prescriptions up from our office. [employer] Activation    Thank you for requesting access to [employer] Please follow the instructions below to securely access and download your online medical record. [employer] allows you to send messages to your doctor, view your test results, renew your prescriptions, schedule appointments, and more. How Do I Sign Up? 1. In your internet browser, go to [email or web address]. 2. Click on the First Time User? Click Here link in the Sign In box. You will see the New Member Sign Up page. 3. Enter your [employer] Access Code exactly as it appears below. You will not need to use this code after youve completed the sign-up process. If you do not sign up before the expiration date, you must request a new code. [employer] Access Code:  Activation code not generated  Current Catap[employer]ter Status: Active (This is the date your Cat[employer]ter access code will )    4. Enter the last four digits of your Social Security Number (xxxx) and Date of Birth (mm/dd/yyyy) as indicated and click Submit. You will be taken to the next sign-up page. 5. Create a [employer] ID. This will be your Cat[employer]ter login ID and cannot be changed, so think of one that is secure and easy to remember. 6. Create a [employer] password. You can change your password at any time. 7. Enter your Password Reset Question and Answer. This can be used at a later time if you forget your password. 8. Enter your e-mail address. You will receive e-mail notification when new information is available in [address] 9. Click Sign Up. You can now view and download portions of your medical record. 10. Click the Download Summary menu link to download a portable copy of your medical information. Additional Information    If you have questions, please call [phone]. Remember, [employer] is NOT to be used for urgent needs. For medical emergencies, dial 911.

## 2022-07-06 RX ORDER — LEVOTHYROXINE SODIUM 50 UG/1
TABLET ORAL
Qty: 90 TABLET | Refills: 2 | Status: SHIPPED | OUTPATIENT
Start: 2022-07-06

## 2022-07-21 NOTE — TELEPHONE ENCOUNTER
Technician called patient to discuss her concerns. Patient stated that she was claustrophobic and did not want to continue therapy. Patient was offered the option to come in for a mask fit / cpap clinic prior to her appointment on 8/24/22. Patient stated that she would also like to cancel her VV on 8/24/22.

## 2022-08-24 ENCOUNTER — OFFICE VISIT (OUTPATIENT)
Dept: INTERNAL MEDICINE CLINIC | Age: 72
End: 2022-08-24
Payer: MEDICARE

## 2022-08-24 VITALS
SYSTOLIC BLOOD PRESSURE: 124 MMHG | WEIGHT: 142 LBS | HEIGHT: 64 IN | BODY MASS INDEX: 24.24 KG/M2 | HEART RATE: 68 BPM | DIASTOLIC BLOOD PRESSURE: 78 MMHG | RESPIRATION RATE: 18 BRPM | TEMPERATURE: 97.6 F | OXYGEN SATURATION: 95 %

## 2022-08-24 DIAGNOSIS — G47.20 DISRUPTED SLEEP-WAKE CYCLE: ICD-10-CM

## 2022-08-24 DIAGNOSIS — R13.10 DYSPHAGIA, UNSPECIFIED TYPE: ICD-10-CM

## 2022-08-24 DIAGNOSIS — K21.9 GASTROESOPHAGEAL REFLUX DISEASE, UNSPECIFIED WHETHER ESOPHAGITIS PRESENT: Primary | ICD-10-CM

## 2022-08-24 DIAGNOSIS — F51.8 DISRUPTED SLEEP-WAKE CYCLE: ICD-10-CM

## 2022-08-24 PROCEDURE — G8420 CALC BMI NORM PARAMETERS: HCPCS | Performed by: FAMILY MEDICINE

## 2022-08-24 PROCEDURE — G8399 PT W/DXA RESULTS DOCUMENT: HCPCS | Performed by: FAMILY MEDICINE

## 2022-08-24 PROCEDURE — 1090F PRES/ABSN URINE INCON ASSESS: CPT | Performed by: FAMILY MEDICINE

## 2022-08-24 PROCEDURE — G8427 DOCREV CUR MEDS BY ELIG CLIN: HCPCS | Performed by: FAMILY MEDICINE

## 2022-08-24 PROCEDURE — 3017F COLORECTAL CA SCREEN DOC REV: CPT | Performed by: FAMILY MEDICINE

## 2022-08-24 PROCEDURE — G8510 SCR DEP NEG, NO PLAN REQD: HCPCS | Performed by: FAMILY MEDICINE

## 2022-08-24 PROCEDURE — G8536 NO DOC ELDER MAL SCRN: HCPCS | Performed by: FAMILY MEDICINE

## 2022-08-24 PROCEDURE — 1101F PT FALLS ASSESS-DOCD LE1/YR: CPT | Performed by: FAMILY MEDICINE

## 2022-08-24 PROCEDURE — 99213 OFFICE O/P EST LOW 20 MIN: CPT | Performed by: FAMILY MEDICINE

## 2022-08-24 PROCEDURE — G9899 SCRN MAM PERF RSLTS DOC: HCPCS | Performed by: FAMILY MEDICINE

## 2022-08-24 RX ORDER — DIAZEPAM 5 MG/1
5 TABLET ORAL
Qty: 15 TABLET | Refills: 0 | Status: SHIPPED | OUTPATIENT
Start: 2022-08-24

## 2022-08-24 NOTE — PROGRESS NOTES
Suzan Landry is a 66 y.o. female  HIPAA verified by two patient identifiers. Health Maintenance Due   Topic    COVID-19 Vaccine (1)    Shingrix Vaccine Age 50> (1 of 2)    Pneumococcal 65+ years (2 - PPSV23 or PCV20)     Chief Complaint   Patient presents with    Follow-up     Visit Vitals  /78   Pulse 68   Temp 97.6 °F (36.4 °C) (Temporal)   Resp 18   Ht 5' 4\" (1.626 m)   Wt 142 lb (64.4 kg)   SpO2 95%   BMI 24.37 kg/m²       Pain Scale: 0 - No pain/10  Pain Location:   1. Have you been to the ER, urgent care clinic since your last visit? Hospitalized since your last visit? No    2. Have you seen or consulted any other health care providers outside of the [address] [place] [address] since your last visit? Include any pap smears or colon screening.  No

## 2022-08-24 NOTE — PATIENT INSTRUCTIONS
Trial Prilosec OTC 20mg daily for 2 weeks. Consider an upper GI test if not better. If treating reflux does not help,  consider treating postnasal drip: claritin, allegra or zyrtec.

## 2022-08-24 NOTE — PROGRESS NOTES
Mariposa Bustos is a 66 y.o. female who presents with concern of  episodes of choking at night, every few weeks. Has throat clearing and coughing, minimally productive. reports dysphagia with watermelon or vitamins. Some GERD symptoms, worse at night. Has baking soda and water. Is on CPAP for JIE. Using humidifier with it. Concerned about weight gain. 3# up from 2022. Exercises every day. Low calorie diet. Levothyroxine 50mg daily. TSH at goal in 2022.         Past Medical History:   Diagnosis Date    Hepatitis A     Hypercholesterolemia        Family History   Problem Relation Age of Onset    Cancer Mother         cervical    Hypertension Mother     Gall Bladder Disease Mother     Heart Disease Father     No Known Problems Sister     No Known Problems Brother        Social History     Socioeconomic History    Marital status:      Spouse name: Not on file    Number of children: Not on file    Years of education: Not on file    Highest education level: Not on file   Occupational History    Not on file   Tobacco Use    Smoking status: Former     Packs/day: 0.25     Years: 2.00     Pack years: 0.50     Types: Cigarettes     Quit date: 1970     Years since quittin.8    Smokeless tobacco: Never   Vaping Use    Vaping Use: Never used   Substance and Sexual Activity    Alcohol use: Yes     Comment: seldom    Drug use: No    Sexual activity: Not Currently     Partners: Male   Other Topics Concern    Not on file   Social History Narrative    Not on file     Social Determinants of Health     Financial Resource Strain: Not on file   Food Insecurity: Not on file   Transportation Needs: Not on file   Physical Activity: Not on file   Stress: Not on file   Social Connections: Not on file   Intimate Partner Violence: Not on file   Housing Stability: Not on file       Current Outpatient Medications on File Prior to Visit   Medication Sig Dispense Refill    levothyroxine (SYNTHROID) 50 mcg tablet TAKE 1 TABLET BY MOUTH EVERY DAY BEFORE BREAKFAST 90 Tablet 2    COLLAGEN by Does Not Apply route. zinc sulfate (ZINC-15 PO) Take  by mouth. ascorbic acid, vitamin C, (VITAMIN C) 500 mg tablet Take 3,000 mg by mouth.      cyanocobalamin 1,000 mcg tablet Take 1,000 mcg by mouth daily. potassium 99 mg tablet Take 99 mg by mouth daily. 3 =times a week      cholecalciferol (VITAMIN D3) (1000 Units /25 mcg) tablet Take 5,000 Units by mouth daily. [DISCONTINUED] diazePAM (Valium) 5 mg tablet Take 1 Tablet by mouth nightly as needed for Anxiety or Sleep. Max Daily Amount: 5 mg. 15 Tablet 0    [DISCONTINUED] tumeric-ging-olive-oreg-capryl 100 mg-150 mg- 50 mg-150 mg cap Take  by mouth. (Patient not taking: No sig reported)      [DISCONTINUED] GINGER ROOT, BULK, by Does Not Apply route. (Patient not taking: No sig reported)       No current facility-administered medications on file prior to visit. Review of Systems  Pertinent items are noted in HPI. Objective:     Visit Vitals  /78   Pulse 68   Temp 97.6 °F (36.4 °C) (Temporal)   Resp 18   Ht 5' 4\" (1.626 m)   Wt 142 lb (64.4 kg)   SpO2 95%   BMI 24.37 kg/m²     Gen: well appearing female  HEENT:   PERRL,normal conjunctiva. External ear and canals normal, TMs no opacification or erythema,  OP no erythema, no exudates, MMM  Neck:  Supple. Thyroid normal size, nontender, without nodules. No masses or LAD  Resp:  No wheezing, no rhonchi, no rales. CV:  RRR, normal S1S2, no murmur. GI: soft, nontender, without masses. No hepatosplenomegaly. Extrem:  +2 pulses, no edema, warm distally      Assessment/Plan:     1. Gastroesophageal reflux disease, unspecified whether esophagitis present    2. Dysphagia, unspecified type    3. Disrupted sleep-wake cycle    Trial Prilosec OTC 20mg daily for 2 weeks. Consider an upper GI test if not better.       If treating reflux does not help,  consider treating postnasal drip: claritin, allegra or zyrtec. Follow-up and Dispositions    Return if symptoms worsen or fail to improve.          Lanre Dhillon MD

## 2022-10-27 ENCOUNTER — TRANSCRIBE ORDER (OUTPATIENT)
Dept: SCHEDULING | Age: 72
End: 2022-10-27

## 2022-10-27 DIAGNOSIS — Z12.31 VISIT FOR SCREENING MAMMOGRAM: Primary | ICD-10-CM

## 2022-12-06 ENCOUNTER — HOSPITAL ENCOUNTER (OUTPATIENT)
Dept: MAMMOGRAPHY | Age: 72
Discharge: HOME | End: 2022-12-06
Attending: FAMILY MEDICINE
Payer: MEDICARE

## 2022-12-06 DIAGNOSIS — Z12.31 VISIT FOR SCREENING MAMMOGRAM: ICD-10-CM

## 2022-12-06 PROCEDURE — 77067 SCR MAMMO BI INCL CAD: CPT

## 2023-07-12 SDOH — HEALTH STABILITY: PHYSICAL HEALTH: ON AVERAGE, HOW MANY DAYS PER WEEK DO YOU ENGAGE IN MODERATE TO STRENUOUS EXERCISE (LIKE A BRISK WALK)?: 6 DAYS

## 2023-07-12 SDOH — ECONOMIC STABILITY: TRANSPORTATION INSECURITY
IN THE PAST 12 MONTHS, HAS LACK OF TRANSPORTATION KEPT YOU FROM MEETINGS, WORK, OR FROM GETTING THINGS NEEDED FOR DAILY LIVING?: NO

## 2023-07-12 SDOH — ECONOMIC STABILITY: INCOME INSECURITY: HOW HARD IS IT FOR YOU TO PAY FOR THE VERY BASICS LIKE FOOD, HOUSING, MEDICAL CARE, AND HEATING?: NOT HARD AT ALL

## 2023-07-12 SDOH — ECONOMIC STABILITY: FOOD INSECURITY: WITHIN THE PAST 12 MONTHS, YOU WORRIED THAT YOUR FOOD WOULD RUN OUT BEFORE YOU GOT MONEY TO BUY MORE.: NEVER TRUE

## 2023-07-12 SDOH — ECONOMIC STABILITY: FOOD INSECURITY: WITHIN THE PAST 12 MONTHS, THE FOOD YOU BOUGHT JUST DIDN'T LAST AND YOU DIDN'T HAVE MONEY TO GET MORE.: NEVER TRUE

## 2023-07-12 SDOH — ECONOMIC STABILITY: HOUSING INSECURITY
IN THE LAST 12 MONTHS, WAS THERE A TIME WHEN YOU DID NOT HAVE A STEADY PLACE TO SLEEP OR SLEPT IN A SHELTER (INCLUDING NOW)?: NO

## 2023-07-12 SDOH — HEALTH STABILITY: PHYSICAL HEALTH: ON AVERAGE, HOW MANY MINUTES DO YOU ENGAGE IN EXERCISE AT THIS LEVEL?: 60 MIN

## 2023-07-12 ASSESSMENT — LIFESTYLE VARIABLES
HOW OFTEN DO YOU HAVE A DRINK CONTAINING ALCOHOL: 1
HOW OFTEN DO YOU HAVE SIX OR MORE DRINKS ON ONE OCCASION: 1
HOW OFTEN DO YOU HAVE A DRINK CONTAINING ALCOHOL: NEVER
HOW MANY STANDARD DRINKS CONTAINING ALCOHOL DO YOU HAVE ON A TYPICAL DAY: 0
HOW MANY STANDARD DRINKS CONTAINING ALCOHOL DO YOU HAVE ON A TYPICAL DAY: PATIENT DOES NOT DRINK

## 2023-07-12 ASSESSMENT — PATIENT HEALTH QUESTIONNAIRE - PHQ9
SUM OF ALL RESPONSES TO PHQ QUESTIONS 1-9: 0
SUM OF ALL RESPONSES TO PHQ QUESTIONS 1-9: 0
SUM OF ALL RESPONSES TO PHQ9 QUESTIONS 1 & 2: 0
SUM OF ALL RESPONSES TO PHQ QUESTIONS 1-9: 0
2. FEELING DOWN, DEPRESSED OR HOPELESS: 0
SUM OF ALL RESPONSES TO PHQ QUESTIONS 1-9: 0
1. LITTLE INTEREST OR PLEASURE IN DOING THINGS: 0

## 2023-07-14 ENCOUNTER — OFFICE VISIT (OUTPATIENT)
Age: 73
End: 2023-07-14
Payer: MEDICARE

## 2023-07-14 VITALS
BODY MASS INDEX: 21.68 KG/M2 | HEIGHT: 64 IN | WEIGHT: 127 LBS | HEART RATE: 73 BPM | TEMPERATURE: 96.1 F | RESPIRATION RATE: 16 BRPM | SYSTOLIC BLOOD PRESSURE: 127 MMHG | OXYGEN SATURATION: 94 % | DIASTOLIC BLOOD PRESSURE: 78 MMHG

## 2023-07-14 DIAGNOSIS — R73.09 ELEVATED GLUCOSE: ICD-10-CM

## 2023-07-14 DIAGNOSIS — E53.8 VITAMIN B12 DEFICIENCY: ICD-10-CM

## 2023-07-14 DIAGNOSIS — Z78.0 POSTMENOPAUSAL: ICD-10-CM

## 2023-07-14 DIAGNOSIS — E78.00 PURE HYPERCHOLESTEROLEMIA: ICD-10-CM

## 2023-07-14 DIAGNOSIS — Z00.00 MEDICARE ANNUAL WELLNESS VISIT, SUBSEQUENT: ICD-10-CM

## 2023-07-14 DIAGNOSIS — R79.9 ABNORMAL FINDING OF BLOOD CHEMISTRY, UNSPECIFIED: ICD-10-CM

## 2023-07-14 DIAGNOSIS — M85.80 OSTEOPENIA, UNSPECIFIED LOCATION: ICD-10-CM

## 2023-07-14 DIAGNOSIS — E55.9 VITAMIN D DEFICIENCY: ICD-10-CM

## 2023-07-14 DIAGNOSIS — E03.9 ACQUIRED HYPOTHYROIDISM: Primary | ICD-10-CM

## 2023-07-14 DIAGNOSIS — R20.2 PARESTHESIA: ICD-10-CM

## 2023-07-14 DIAGNOSIS — Z12.31 ENCOUNTER FOR SCREENING MAMMOGRAM FOR BREAST CANCER: ICD-10-CM

## 2023-07-14 PROCEDURE — 99214 OFFICE O/P EST MOD 30 MIN: CPT | Performed by: FAMILY MEDICINE

## 2023-07-14 RX ORDER — LEVOTHYROXINE, LIOTHYRONINE 57; 13.5 UG/1; UG/1
90 TABLET ORAL DAILY
COMMUNITY
Start: 2023-06-07

## 2023-07-14 NOTE — PROGRESS NOTES
1. \"Have you been to the ER, urgent care clinic since your last visit? Hospitalized since your last visit? \" No     2. \"Have you seen or consulted any other health care providers outside of the [address] [place] [address] since your last visit? \" Shanika Oliver, [facility] [address]      3. For patients aged 43-73: Has the patient had a colonoscopy / FIT/ Cologuard? Yes      If the patient is female:    4. For patients aged 43-66: Has the patient had a mammogram within the past 2 years? Yes       5. For patients aged 21-65: Has the patient had a pap smear?  Yes 3/2/2023
9. Osteopenia, unspecified location  DEXA BONE DENSITY AXIAL SKELETON      10. Abnormal finding of blood chemistry, unspecified  Ferritin      11. Medicare annual wellness visit, subsequent        Discussed possible cervical cause of paresthesias in arms. Return in 1 year (on 7/14/2024) for follow up pending labs and annual.  .     Sofia Harvey MD  Medicare Annual Wellness Visit    Kassandra Sanchez is here for Medicare AWV    Assessment & Plan   Acquired hypothyroidism  -     TSH; Future  Elevated glucose  -     Hemoglobin A1C; Future  Pure hypercholesterolemia  -     Lipid Panel; Future  Vitamin D deficiency  -     Vitamin D 25 Hydroxy; Future  Vitamin B12 deficiency  -     Vitamin B12; Future  Paresthesia  -     Ferritin; Future  -     TSH; Future  -     Vitamin B12; Future  -     Comprehensive Metabolic Panel; Future  -     CBC with Auto Differential; Future  -     Folate; Future  -     XR CERVICAL SPINE (4 OR 5 VIEWS); Future  Encounter for screening mammogram for breast cancer  -     AMANDA ARTEMIO DIGITAL SCREEN BILATERAL; Future  Postmenopausal  -     DEXA BONE DENSITY AXIAL SKELETON; Future  Osteopenia, unspecified location  -     DEXA BONE DENSITY AXIAL SKELETON; Future  Abnormal finding of blood chemistry, unspecified  -     Ferritin; Future  Medicare annual wellness visit, subsequent    Recommendations for Preventive Services Due: see orders and patient instructions/AVS.  Recommended screening schedule for the next 5-10 years is provided to the patient in written form: see Patient Instructions/AVS.     Return in 1 year (on 7/14/2024) for follow up pending labs and annual.  .     Subjective   The following acute and/or chronic problems were also addressed today:    Patient's complete Health Risk Assessment and screening values have been reviewed and are found in Flowsheets.  The following problems were reviewed today and where indicated follow up appointments were made and/or referrals

## 2023-07-14 NOTE — PATIENT INSTRUCTIONS
these benefits include a comprehensive review of your medical history including lifestyle, illnesses that may run in your family, and various assessments and screenings as appropriate. After reviewing your medical record and screening and assessments performed today your provider may have ordered immunizations, labs, imaging, and/or referrals for you. A list of these orders (if applicable) as well as your Preventive Care list are included within your After Visit Summary for your review. Other Preventive Recommendations:    A preventive eye exam performed by an eye specialist is recommended every 1-2 years to screen for glaucoma; cataracts, macular degeneration, and other eye disorders. A preventive dental visit is recommended every 6 months. Try to get at least 150 minutes of exercise per week or 10,000 steps per day on a pedometer . Order or download the FREE \"Exercise & Physical Activity: Your Everyday Guide\" from [employer]. Call 1-394.923.5145 or search [employer] online. You need 6257-5341 mg of calcium and 9238-9152 IU of vitamin D per day. It is possible to meet your calcium requirement with diet alone, but a vitamin D supplement is usually necessary to meet this goal.  When exposed to the sun, use a sunscreen that protects against both UVA and UVB radiation with an SPF of 30 or greater. Reapply every 2 to 3 hours or after sweating, drying off with a towel, or swimming. Always wear a seat belt when traveling in a car. Always wear a helmet when riding a bicycle or motorcycle.

## 2023-07-17 ENCOUNTER — HOSPITAL ENCOUNTER (OUTPATIENT)
Facility: HOSPITAL | Age: 73
Discharge: HOME | End: 2023-07-20
Payer: MEDICARE

## 2023-07-17 DIAGNOSIS — R20.2 PARESTHESIA: ICD-10-CM

## 2023-07-17 PROCEDURE — 72050 X-RAY EXAM NECK SPINE 4/5VWS: CPT

## 2023-07-18 LAB
25(OH)D3+25(OH)D2 SERPL-MCNC: 81.6 NG/ML (ref 30–100)
ALBUMIN SERPL-MCNC: 4.3 G/DL (ref 3.8–4.8)
ALBUMIN/GLOB SERPL: 2.7 {RATIO} (ref 1.2–2.2)
ALP SERPL-CCNC: 41 IU/L (ref 44–121)
ALT SERPL-CCNC: 10 IU/L (ref 0–32)
AST SERPL-CCNC: 15 IU/L (ref 0–40)
BASOPHILS # BLD AUTO: 0 X10E3/UL (ref 0–0.2)
BASOPHILS NFR BLD AUTO: 1 %
BILIRUB SERPL-MCNC: 0.4 MG/DL (ref 0–1.2)
BUN SERPL-MCNC: 12 MG/DL (ref 8–27)
BUN/CREAT SERPL: 13 (ref 12–28)
CALCIUM SERPL-MCNC: 9.1 MG/DL (ref 8.7–10.3)
CHLORIDE SERPL-SCNC: 107 MMOL/L (ref 96–106)
CHOLEST SERPL-MCNC: 158 MG/DL (ref 100–199)
CO2 SERPL-SCNC: 22 MMOL/L (ref 20–29)
CREAT SERPL-MCNC: 0.89 MG/DL (ref 0.57–1)
EGFRCR SERPLBLD CKD-EPI 2021: 68 ML/MIN/1.73
EOSINOPHIL # BLD AUTO: 0.1 X10E3/UL (ref 0–0.4)
EOSINOPHIL NFR BLD AUTO: 1 %
ERYTHROCYTE [DISTWIDTH] IN BLOOD BY AUTOMATED COUNT: 13.1 % (ref 11.7–15.4)
FERRITIN SERPL-MCNC: 58 NG/ML (ref 15–150)
FOLATE BLD-MCNC: 363 NG/ML
FOLATE RBC-MCNC: 862 NG/ML
GLOBULIN SER CALC-MCNC: 1.6 G/DL (ref 1.5–4.5)
GLUCOSE SERPL-MCNC: 95 MG/DL (ref 70–99)
HBA1C MFR BLD: 5.6 % (ref 4.8–5.6)
HCT VFR BLD AUTO: 42.1 % (ref 34–46.6)
HDLC SERPL-MCNC: 55 MG/DL
HGB BLD-MCNC: 13.5 G/DL (ref 11.1–15.9)
IMM GRANULOCYTES # BLD AUTO: 0 X10E3/UL (ref 0–0.1)
IMM GRANULOCYTES NFR BLD AUTO: 0 %
LDLC SERPL CALC-MCNC: 89 MG/DL (ref 0–99)
LYMPHOCYTES # BLD AUTO: 1.4 X10E3/UL (ref 0.7–3.1)
LYMPHOCYTES NFR BLD AUTO: 21 %
MCH RBC QN AUTO: 28.1 PG (ref 26.6–33)
MCHC RBC AUTO-ENTMCNC: 32.1 G/DL (ref 31.5–35.7)
MCV RBC AUTO: 88 FL (ref 79–97)
MONOCYTES # BLD AUTO: 0.5 X10E3/UL (ref 0.1–0.9)
MONOCYTES NFR BLD AUTO: 8 %
NEUTROPHILS # BLD AUTO: 4.5 X10E3/UL (ref 1.4–7)
NEUTROPHILS NFR BLD AUTO: 69 %
PLATELET # BLD AUTO: 325 X10E3/UL (ref 150–450)
POTASSIUM SERPL-SCNC: 4.8 MMOL/L (ref 3.5–5.2)
PROT SERPL-MCNC: 5.9 G/DL (ref 6–8.5)
RBC # BLD AUTO: 4.81 X10E6/UL (ref 3.77–5.28)
SODIUM SERPL-SCNC: 142 MMOL/L (ref 134–144)
TRIGL SERPL-MCNC: 72 MG/DL (ref 0–149)
TSH SERPL DL<=0.005 MIU/L-ACNC: 0.04 UIU/ML (ref 0.45–4.5)
VIT B12 SERPL-MCNC: >2000 PG/ML (ref 232–1245)
VLDLC SERPL CALC-MCNC: 14 MG/DL (ref 5–40)
WBC # BLD AUTO: 6.5 X10E3/UL (ref 3.4–10.8)

## 2023-07-19 ENCOUNTER — TELEPHONE (OUTPATIENT)
Age: 73
End: 2023-07-19

## 2023-12-08 ENCOUNTER — HOSPITAL ENCOUNTER (OUTPATIENT)
Facility: HOSPITAL | Age: 73
End: 2023-12-08
Attending: FAMILY MEDICINE
Payer: MEDICARE

## 2023-12-08 VITALS — BODY MASS INDEX: 21.68 KG/M2 | HEIGHT: 64 IN | WEIGHT: 127 LBS

## 2023-12-08 DIAGNOSIS — Z12.31 ENCOUNTER FOR SCREENING MAMMOGRAM FOR BREAST CANCER: ICD-10-CM

## 2023-12-08 PROCEDURE — 77063 BREAST TOMOSYNTHESIS BI: CPT

## 2024-03-19 ENCOUNTER — OFFICE VISIT (OUTPATIENT)
Age: 74
End: 2024-03-19
Payer: MEDICARE

## 2024-03-19 VITALS
BODY MASS INDEX: 21.44 KG/M2 | RESPIRATION RATE: 16 BRPM | HEART RATE: 81 BPM | DIASTOLIC BLOOD PRESSURE: 64 MMHG | OXYGEN SATURATION: 97 % | SYSTOLIC BLOOD PRESSURE: 116 MMHG | HEIGHT: 64 IN | TEMPERATURE: 97.3 F | WEIGHT: 125.6 LBS

## 2024-03-19 DIAGNOSIS — G56.02 CARPAL TUNNEL SYNDROME OF LEFT WRIST: ICD-10-CM

## 2024-03-19 DIAGNOSIS — M47.22 CERVICAL RADICULOPATHY DUE TO DEGENERATIVE JOINT DISEASE OF SPINE: ICD-10-CM

## 2024-03-19 DIAGNOSIS — R20.0 NUMBNESS AND TINGLING IN LEFT ARM: Primary | ICD-10-CM

## 2024-03-19 DIAGNOSIS — R20.2 NUMBNESS AND TINGLING IN LEFT ARM: Primary | ICD-10-CM

## 2024-03-19 PROCEDURE — G8428 CUR MEDS NOT DOCUMENT: HCPCS | Performed by: PSYCHIATRY & NEUROLOGY

## 2024-03-19 PROCEDURE — 1090F PRES/ABSN URINE INCON ASSESS: CPT | Performed by: PSYCHIATRY & NEUROLOGY

## 2024-03-19 PROCEDURE — 99204 OFFICE O/P NEW MOD 45 MIN: CPT | Performed by: PSYCHIATRY & NEUROLOGY

## 2024-03-19 PROCEDURE — G8420 CALC BMI NORM PARAMETERS: HCPCS | Performed by: PSYCHIATRY & NEUROLOGY

## 2024-03-19 PROCEDURE — G8399 PT W/DXA RESULTS DOCUMENT: HCPCS | Performed by: PSYCHIATRY & NEUROLOGY

## 2024-03-19 PROCEDURE — 3017F COLORECTAL CA SCREEN DOC REV: CPT | Performed by: PSYCHIATRY & NEUROLOGY

## 2024-03-19 PROCEDURE — 1123F ACP DISCUSS/DSCN MKR DOCD: CPT | Performed by: PSYCHIATRY & NEUROLOGY

## 2024-03-19 PROCEDURE — G8484 FLU IMMUNIZE NO ADMIN: HCPCS | Performed by: PSYCHIATRY & NEUROLOGY

## 2024-03-19 PROCEDURE — 1036F TOBACCO NON-USER: CPT | Performed by: PSYCHIATRY & NEUROLOGY

## 2024-03-19 RX ORDER — BIOTIN 1 MG
1000 TABLET ORAL DAILY
COMMUNITY
Start: 2024-02-22

## 2024-03-19 RX ORDER — GINSENG 100 MG
50 CAPSULE ORAL DAILY
COMMUNITY
Start: 2022-12-22

## 2024-03-19 RX ORDER — ESTRADIOL 0.5 MG/1
0.5 TABLET ORAL DAILY
COMMUNITY
Start: 2022-12-22

## 2024-03-19 RX ORDER — MULTIVIT-MIN/IRON/FOLIC ACID/K 18-600-40
1000 CAPSULE ORAL DAILY
COMMUNITY
Start: 2022-12-22

## 2024-03-19 RX ORDER — PROGESTERONE 200 MG/1
300 CAPSULE ORAL DAILY
COMMUNITY
Start: 2023-07-17

## 2024-03-19 RX ORDER — LEVOTHYROXINE, LIOTHYRONINE 38; 9 UG/1; UG/1
60 TABLET ORAL DAILY
COMMUNITY

## 2024-03-19 RX ORDER — NICOTINE POLACRILEX 2 MG
1000 LOZENGE BUCCAL DAILY
COMMUNITY
Start: 2023-11-22

## 2024-03-19 ASSESSMENT — PATIENT HEALTH QUESTIONNAIRE - PHQ9
SUM OF ALL RESPONSES TO PHQ QUESTIONS 1-9: 0
1. LITTLE INTEREST OR PLEASURE IN DOING THINGS: NOT AT ALL
SUM OF ALL RESPONSES TO PHQ QUESTIONS 1-9: 0
SUM OF ALL RESPONSES TO PHQ9 QUESTIONS 1 & 2: 0
SUM OF ALL RESPONSES TO PHQ QUESTIONS 1-9: 0
SUM OF ALL RESPONSES TO PHQ QUESTIONS 1-9: 0

## 2024-03-19 NOTE — PROGRESS NOTES
Consult  REFERRED BY:  Chiquis Benitez MD    CHIEF COMPLAINT:       Subjective:     Henny Pratt is a 73 y.o.right-handed  female we are seeing as a new patient, at the request of Dr. 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  She does not have that much neck pain, but was treated for sleep apnea with a CPAP machine, which did not help her numbness, and she stopped using that because it did not help and she could not tolerate the CPAP.  She has had no weakness in the hands or arms, and only rarely occasionally does she have a sensation that is fleeting in her whole body feels numb for a second or 2.  She had no trauma, or precipitating factors, and there is no position or activity that seems to make it better or worse.  She states she has seen several physicians in the past and may be even a neurologist in the past, but it was a long time ago and she has no idea who she saw and is nothing on the chart that we can review.  Apparently she had no diagnosis ever given to her.  She has normal B12 and folic acid levels, actually on the high side we suggest she cut down her B12 supplements just a bit.  She has no family history of similar problems.  She basically has no other major medical problems except for borderline cholesterol and hepatitis A.  She exercises well, eats a good diet, take care of herself and is very fit at her age.  Bowels and bladder are all normal.    Past Medical History:   Diagnosis Date    Hepatitis A     Hypercholesterolemia

## 2024-03-24 ENCOUNTER — HOSPITAL ENCOUNTER (OUTPATIENT)
Facility: HOSPITAL | Age: 74
Discharge: HOME | End: 2024-03-27
Attending: PSYCHIATRY & NEUROLOGY
Payer: MEDICARE

## 2024-03-24 DIAGNOSIS — R20.0 NUMBNESS AND TINGLING IN LEFT ARM: ICD-10-CM

## 2024-03-24 DIAGNOSIS — G56.02 CARPAL TUNNEL SYNDROME OF LEFT WRIST: ICD-10-CM

## 2024-03-24 DIAGNOSIS — R20.2 NUMBNESS AND TINGLING IN LEFT ARM: ICD-10-CM

## 2024-03-24 DIAGNOSIS — M47.22 CERVICAL RADICULOPATHY DUE TO DEGENERATIVE JOINT DISEASE OF SPINE: ICD-10-CM

## 2024-03-24 PROCEDURE — 72141 MRI NECK SPINE W/O DYE: CPT

## 2024-03-25 ENCOUNTER — CLINICAL DOCUMENTATION (OUTPATIENT)
Age: 74
End: 2024-03-25

## 2024-03-25 NOTE — PROGRESS NOTES
I called the patient, advised her the MRI scan shows more disease on the right side, which does not really explain her symptoms, so we will get the EMG and decide where to go from there.

## 2024-04-24 ENCOUNTER — PROCEDURE VISIT (OUTPATIENT)
Age: 74
End: 2024-04-24

## 2024-04-24 DIAGNOSIS — M47.22 CERVICAL RADICULOPATHY DUE TO DEGENERATIVE JOINT DISEASE OF SPINE: ICD-10-CM

## 2024-04-24 DIAGNOSIS — R20.0 NUMBNESS AND TINGLING IN LEFT ARM: ICD-10-CM

## 2024-04-24 DIAGNOSIS — G56.02 CARPAL TUNNEL SYNDROME OF LEFT WRIST: ICD-10-CM

## 2024-04-24 DIAGNOSIS — R20.2 NUMBNESS AND TINGLING IN LEFT ARM: ICD-10-CM

## 2024-04-24 NOTE — PROCEDURES
ELECTRODIANOSTIC REPORT  Test Date:  2024    Patient: Henny Pratt : 1950 Physician: Case Aponte M.D.   Sex: Female  < Ref Phys:      Technician: Elaine Dejesus    Patient History: Patient has numbness in her hands, usually left arm and hand when she sleeps at night, for EMG to rule out carpal tunnel syndrome, rule out cervical radiculopathy, rule out entrapment neuropathy, without compressive neuropathy, without polyneuropathy or other neuromuscular disease.  Patient with MRI scan that shows no spinal stenosis of significance, but some neuroforaminal disease worse on the right side.    Neuro Exam: Patient has symmetric and equal reflexes throughout and no Babinski and clonus signs present.  Patient has normal muscle bulk and tone and strength in all extremities.  Patient has normal sense examination to pin temperature and vibration in all extremities.  Patient cranial nerves II through XII are intact.  Patient mental status normal patient again states normal.    EMG report: This study shows electrophysiologic evidence of essentially normal EMG and nerve conduction velocity study of both upper extremities, showing no clear evidence of entrapment neuropathy, motor radiculopathy, polyneuropathy, or other neuromuscular disease.  Clinical correlation recommended, correlation with imaging modalities and other metabolic studies may be of value in following this patient if clinically indicated.    EMG & NCV Findings:  Evaluation of the left median motor and the right median motor nerves showed normal distal onset latency (L3.6, R3.8 ms), normal amplitude (L6.8, R7.0 mV), and normal conduction velocity (Elbow-Wrist, L57, R49 m/s).  The left ulnar motor and the right ulnar motor nerves showed normal distal onset latency (L3.1, R2.9 ms), normal amplitude (Wrist, L7.3, R9.6 mV), normal amplitude (B Elbow, L6.6, R8.7 mV), normal amplitude (A Elbow, L6.4, R7.4 mV), decreased conduction velocity (Wrist-Abd

## 2024-04-25 NOTE — PROGRESS NOTES
Patient had a normal EMG study of both upper extremities, showing no clear evidence of cervical radiculopathy, an MRI scan was negative for disease on the left side, where she has most of her symptoms but did show some mild to moderate degenerative changes and neuroforaminal disease on the right side, we will try her physical therapy just to see if there is any improvement in her symptoms, because is otherwise difficult to explain.

## 2024-11-13 ENCOUNTER — TRANSCRIBE ORDERS (OUTPATIENT)
Facility: HOSPITAL | Age: 74
End: 2024-11-13

## 2024-11-13 DIAGNOSIS — Z12.31 VISIT FOR SCREENING MAMMOGRAM: Primary | ICD-10-CM

## 2024-12-31 ENCOUNTER — HOSPITAL ENCOUNTER (OUTPATIENT)
Facility: HOSPITAL | Age: 74
Discharge: HOME | End: 2025-01-03
Attending: FAMILY MEDICINE
Payer: MEDICARE

## 2024-12-31 DIAGNOSIS — Z12.31 VISIT FOR SCREENING MAMMOGRAM: ICD-10-CM

## 2024-12-31 PROCEDURE — 77063 BREAST TOMOSYNTHESIS BI: CPT

## 2025-07-05 ENCOUNTER — HOSPITAL ENCOUNTER (OUTPATIENT)
Facility: HOSPITAL | Age: 75
End: 2025-07-05
Attending: FAMILY MEDICINE
Payer: MEDICARE

## 2025-07-05 DIAGNOSIS — Z78.0 MENOPAUSE: ICD-10-CM

## 2025-07-05 DIAGNOSIS — Z13.820 SPECIAL SCREENING FOR OSTEOPOROSIS: ICD-10-CM

## 2025-07-05 PROCEDURE — 77080 DXA BONE DENSITY AXIAL: CPT

## 2025-08-01 ENCOUNTER — TRANSCRIBE ORDERS (OUTPATIENT)
Facility: HOSPITAL | Age: 75
End: 2025-08-01

## 2025-08-01 DIAGNOSIS — J32.0 CHRONIC MAXILLARY SINUSITIS: Primary | ICD-10-CM

## 2025-08-16 ENCOUNTER — HOSPITAL ENCOUNTER (OUTPATIENT)
Facility: HOSPITAL | Age: 75
Discharge: HOME | End: 2025-08-19
Attending: OTOLARYNGOLOGY
Payer: MEDICARE

## 2025-08-16 DIAGNOSIS — J32.0 CHRONIC MAXILLARY SINUSITIS: ICD-10-CM

## 2025-08-16 PROCEDURE — 70486 CT MAXILLOFACIAL W/O DYE: CPT
